# Patient Record
Sex: FEMALE | Race: WHITE | NOT HISPANIC OR LATINO | Employment: OTHER | URBAN - METROPOLITAN AREA
[De-identification: names, ages, dates, MRNs, and addresses within clinical notes are randomized per-mention and may not be internally consistent; named-entity substitution may affect disease eponyms.]

---

## 2018-09-05 ENCOUNTER — TRANSFERRED RECORDS (OUTPATIENT)
Dept: HEALTH INFORMATION MANAGEMENT | Facility: CLINIC | Age: 73
End: 2018-09-05

## 2018-09-25 ENCOUNTER — TRANSFERRED RECORDS (OUTPATIENT)
Dept: HEALTH INFORMATION MANAGEMENT | Facility: CLINIC | Age: 73
End: 2018-09-25

## 2018-11-16 ENCOUNTER — TRANSFERRED RECORDS (OUTPATIENT)
Dept: HEALTH INFORMATION MANAGEMENT | Facility: CLINIC | Age: 73
End: 2018-11-16

## 2019-09-10 ENCOUNTER — MEDICAL CORRESPONDENCE (OUTPATIENT)
Dept: HEALTH INFORMATION MANAGEMENT | Facility: CLINIC | Age: 74
End: 2019-09-10

## 2019-09-10 ENCOUNTER — TRANSFERRED RECORDS (OUTPATIENT)
Dept: HEALTH INFORMATION MANAGEMENT | Facility: CLINIC | Age: 74
End: 2019-09-10

## 2019-09-16 ENCOUNTER — TELEPHONE (OUTPATIENT)
Dept: AUDIOLOGY | Facility: CLINIC | Age: 74
End: 2019-09-16

## 2024-05-24 ENCOUNTER — APPOINTMENT (OUTPATIENT)
Dept: GENERAL RADIOLOGY | Facility: CLINIC | Age: 79
DRG: 871 | End: 2024-05-24
Attending: HOSPITALIST
Payer: COMMERCIAL

## 2024-05-24 ENCOUNTER — HOSPITAL ENCOUNTER (INPATIENT)
Facility: CLINIC | Age: 79
LOS: 7 days | Discharge: NURSING, REHAB OR LONG-TERM CARE | DRG: 871 | End: 2024-05-31
Attending: EMERGENCY MEDICINE | Admitting: HOSPITALIST
Payer: COMMERCIAL

## 2024-05-24 ENCOUNTER — APPOINTMENT (OUTPATIENT)
Dept: ULTRASOUND IMAGING | Facility: CLINIC | Age: 79
DRG: 871 | End: 2024-05-24
Attending: EMERGENCY MEDICINE
Payer: COMMERCIAL

## 2024-05-24 ENCOUNTER — APPOINTMENT (OUTPATIENT)
Dept: CT IMAGING | Facility: CLINIC | Age: 79
DRG: 871 | End: 2024-05-24
Attending: EMERGENCY MEDICINE
Payer: COMMERCIAL

## 2024-05-24 ENCOUNTER — APPOINTMENT (OUTPATIENT)
Dept: GENERAL RADIOLOGY | Facility: CLINIC | Age: 79
DRG: 871 | End: 2024-05-24
Attending: EMERGENCY MEDICINE
Payer: COMMERCIAL

## 2024-05-24 DIAGNOSIS — F90.1 ATTENTION DEFICIT HYPERACTIVITY DISORDER (ADHD), PREDOMINANTLY HYPERACTIVE TYPE: ICD-10-CM

## 2024-05-24 DIAGNOSIS — J90 PLEURAL EFFUSION ON LEFT: ICD-10-CM

## 2024-05-24 DIAGNOSIS — J18.9 PNEUMONIA OF LEFT LUNG DUE TO INFECTIOUS ORGANISM, UNSPECIFIED PART OF LUNG: ICD-10-CM

## 2024-05-24 DIAGNOSIS — A41.9 SEPTIC SHOCK (H): ICD-10-CM

## 2024-05-24 DIAGNOSIS — A41.9 SEVERE SEPSIS (H): Primary | ICD-10-CM

## 2024-05-24 DIAGNOSIS — R65.21 SEPTIC SHOCK (H): ICD-10-CM

## 2024-05-24 DIAGNOSIS — R65.20 SEVERE SEPSIS (H): Primary | ICD-10-CM

## 2024-05-24 DIAGNOSIS — F41.9 ANXIETY: ICD-10-CM

## 2024-05-24 LAB
ABO/RH(D): NORMAL
ALBUMIN SERPL BCG-MCNC: 2.7 G/DL (ref 3.5–5.2)
ALP SERPL-CCNC: 182 U/L (ref 40–150)
ALT SERPL W P-5'-P-CCNC: 51 U/L (ref 0–50)
ANION GAP SERPL CALCULATED.3IONS-SCNC: 19 MMOL/L (ref 7–15)
ANTIBODY SCREEN: NEGATIVE
APPEARANCE FLD: ABNORMAL
AST SERPL W P-5'-P-CCNC: 63 U/L (ref 0–45)
ATRIAL RATE - MUSE: 109 BPM
BASE EXCESS BLDV CALC-SCNC: 6 MMOL/L (ref -3–3)
BASOPHILS # BLD AUTO: 0.1 10E3/UL (ref 0–0.2)
BASOPHILS NFR BLD AUTO: 0 %
BILIRUB SERPL-MCNC: 0.7 MG/DL
BUN SERPL-MCNC: 32.2 MG/DL (ref 8–23)
CALCIUM SERPL-MCNC: 8.7 MG/DL (ref 8.8–10.2)
CELL COUNT BODY FLUID SOURCE: ABNORMAL
CHLORIDE SERPL-SCNC: 97 MMOL/L (ref 98–107)
COLOR FLD: YELLOW
CREAT SERPL-MCNC: 0.63 MG/DL (ref 0.51–0.95)
D DIMER PPP FEU-MCNC: 1.17 UG/ML FEU (ref 0–0.5)
DEPRECATED HCO3 PLAS-SCNC: 27 MMOL/L (ref 22–29)
DIASTOLIC BLOOD PRESSURE - MUSE: NORMAL MMHG
EGFRCR SERPLBLD CKD-EPI 2021: 90 ML/MIN/1.73M2
EOSINOPHIL # BLD AUTO: 0 10E3/UL (ref 0–0.7)
EOSINOPHIL NFR BLD AUTO: 0 %
ERYTHROCYTE [DISTWIDTH] IN BLOOD BY AUTOMATED COUNT: 15.1 % (ref 10–15)
FLUAV RNA SPEC QL NAA+PROBE: NEGATIVE
FLUBV RNA RESP QL NAA+PROBE: NEGATIVE
GLUCOSE BLDC GLUCOMTR-MCNC: 124 MG/DL (ref 70–99)
GLUCOSE SERPL-MCNC: 181 MG/DL (ref 70–99)
HCO3 BLDV-SCNC: 32 MMOL/L (ref 21–28)
HCT VFR BLD AUTO: 41.1 % (ref 35–47)
HGB BLD-MCNC: 12.8 G/DL (ref 11.7–15.7)
HOLD SPECIMEN: NORMAL
HOLD SPECIMEN: NORMAL
IMM GRANULOCYTES # BLD: 0.3 10E3/UL
IMM GRANULOCYTES NFR BLD: 1 %
INTERPRETATION ECG - MUSE: NORMAL
LACTATE BLD-SCNC: 4.8 MMOL/L
LACTATE SERPL-SCNC: 3.2 MMOL/L (ref 0.7–2)
LACTATE SERPL-SCNC: 3.2 MMOL/L (ref 0.7–2)
LACTATE SERPL-SCNC: 3.5 MMOL/L (ref 0.7–2)
LYMPHOCYTES # BLD AUTO: 2.2 10E3/UL (ref 0.8–5.3)
LYMPHOCYTES NFR BLD AUTO: 12 %
MAGNESIUM SERPL-MCNC: 2.2 MG/DL (ref 1.7–2.3)
MCH RBC QN AUTO: 27.8 PG (ref 26.5–33)
MCHC RBC AUTO-ENTMCNC: 31.1 G/DL (ref 31.5–36.5)
MCV RBC AUTO: 89 FL (ref 78–100)
MONOCYTES # BLD AUTO: 2.2 10E3/UL (ref 0–1.3)
MONOCYTES NFR BLD AUTO: 12 %
NEUTROPHILS # BLD AUTO: 13.2 10E3/UL (ref 1.6–8.3)
NEUTROPHILS NFR BLD AUTO: 75 %
NRBC # BLD AUTO: 0.1 10E3/UL
NRBC BLD AUTO-RTO: 0 /100
P AXIS - MUSE: 49 DEGREES
PCO2 BLDV: 52 MM HG (ref 40–50)
PH BLDV: 7.41 [PH] (ref 7.32–7.43)
PLATELET # BLD AUTO: 801 10E3/UL (ref 150–450)
PO2 BLDV: 25 MM HG (ref 25–47)
POTASSIUM SERPL-SCNC: 3.7 MMOL/L (ref 3.4–5.3)
PR INTERVAL - MUSE: 138 MS
PROT SERPL-MCNC: 6.6 G/DL (ref 6.4–8.3)
QRS DURATION - MUSE: 68 MS
QT - MUSE: 366 MS
QTC - MUSE: 492 MS
R AXIS - MUSE: 48 DEGREES
RBC # BLD AUTO: 4.6 10E6/UL (ref 3.8–5.2)
RSV RNA SPEC NAA+PROBE: NEGATIVE
SAO2 % BLDV: 45 % (ref 70–75)
SARS-COV-2 RNA RESP QL NAA+PROBE: NEGATIVE
SODIUM SERPL-SCNC: 143 MMOL/L (ref 135–145)
SPECIMEN EXPIRATION DATE: NORMAL
SYSTOLIC BLOOD PRESSURE - MUSE: NORMAL MMHG
T AXIS - MUSE: 39 DEGREES
TSH SERPL DL<=0.005 MIU/L-ACNC: 5.15 UIU/ML (ref 0.3–4.2)
VENTRICULAR RATE- MUSE: 109 BPM
WBC # BLD AUTO: 17.9 10E3/UL (ref 4–11)
WBC # FLD AUTO: ABNORMAL /UL

## 2024-05-24 PROCEDURE — 88305 TISSUE EXAM BY PATHOLOGIST: CPT | Mod: TC | Performed by: EMERGENCY MEDICINE

## 2024-05-24 PROCEDURE — 258N000003 HC RX IP 258 OP 636: Performed by: EMERGENCY MEDICINE

## 2024-05-24 PROCEDURE — 250N000011 HC RX IP 250 OP 636: Performed by: EMERGENCY MEDICINE

## 2024-05-24 PROCEDURE — 83605 ASSAY OF LACTIC ACID: CPT

## 2024-05-24 PROCEDURE — 258N000003 HC RX IP 258 OP 636: Performed by: HOSPITALIST

## 2024-05-24 PROCEDURE — 99285 EMERGENCY DEPT VISIT HI MDM: CPT | Mod: 25

## 2024-05-24 PROCEDURE — 87205 SMEAR GRAM STAIN: CPT | Performed by: EMERGENCY MEDICINE

## 2024-05-24 PROCEDURE — 86900 BLOOD TYPING SEROLOGIC ABO: CPT | Performed by: EMERGENCY MEDICINE

## 2024-05-24 PROCEDURE — C1729 CATH, DRAINAGE: HCPCS

## 2024-05-24 PROCEDURE — 99223 1ST HOSP IP/OBS HIGH 75: CPT | Mod: AI | Performed by: HOSPITALIST

## 2024-05-24 PROCEDURE — 120N000013 HC R&B IMCU

## 2024-05-24 PROCEDURE — 87637 SARSCOV2&INF A&B&RSV AMP PRB: CPT | Performed by: EMERGENCY MEDICINE

## 2024-05-24 PROCEDURE — 87070 CULTURE OTHR SPECIMN AEROBIC: CPT | Performed by: EMERGENCY MEDICINE

## 2024-05-24 PROCEDURE — 83735 ASSAY OF MAGNESIUM: CPT | Performed by: EMERGENCY MEDICINE

## 2024-05-24 PROCEDURE — 36415 COLL VENOUS BLD VENIPUNCTURE: CPT | Performed by: EMERGENCY MEDICINE

## 2024-05-24 PROCEDURE — 93005 ELECTROCARDIOGRAM TRACING: CPT

## 2024-05-24 PROCEDURE — 82040 ASSAY OF SERUM ALBUMIN: CPT | Performed by: EMERGENCY MEDICINE

## 2024-05-24 PROCEDURE — 250N000009 HC RX 250: Performed by: EMERGENCY MEDICINE

## 2024-05-24 PROCEDURE — 0W9B30Z DRAINAGE OF LEFT PLEURAL CAVITY WITH DRAINAGE DEVICE, PERCUTANEOUS APPROACH: ICD-10-PCS | Performed by: RADIOLOGY

## 2024-05-24 PROCEDURE — 999N000065 XR CHEST PORT 1 VIEW

## 2024-05-24 PROCEDURE — C1769 GUIDE WIRE: HCPCS

## 2024-05-24 PROCEDURE — 87075 CULTR BACTERIA EXCEPT BLOOD: CPT | Performed by: EMERGENCY MEDICINE

## 2024-05-24 PROCEDURE — 89050 BODY FLUID CELL COUNT: CPT | Performed by: EMERGENCY MEDICINE

## 2024-05-24 PROCEDURE — 96365 THER/PROPH/DIAG IV INF INIT: CPT | Mod: 59

## 2024-05-24 PROCEDURE — 87186 SC STD MICRODIL/AGAR DIL: CPT | Performed by: EMERGENCY MEDICINE

## 2024-05-24 PROCEDURE — 71275 CT ANGIOGRAPHY CHEST: CPT

## 2024-05-24 PROCEDURE — 96366 THER/PROPH/DIAG IV INF ADDON: CPT

## 2024-05-24 PROCEDURE — 96367 TX/PROPH/DG ADDL SEQ IV INF: CPT

## 2024-05-24 PROCEDURE — 82803 BLOOD GASES ANY COMBINATION: CPT

## 2024-05-24 PROCEDURE — 87149 DNA/RNA DIRECT PROBE: CPT | Performed by: EMERGENCY MEDICINE

## 2024-05-24 PROCEDURE — 36415 COLL VENOUS BLD VENIPUNCTURE: CPT

## 2024-05-24 PROCEDURE — 71045 X-RAY EXAM CHEST 1 VIEW: CPT

## 2024-05-24 PROCEDURE — 84439 ASSAY OF FREE THYROXINE: CPT | Performed by: HOSPITALIST

## 2024-05-24 PROCEDURE — 250N000011 HC RX IP 250 OP 636: Performed by: HOSPITALIST

## 2024-05-24 PROCEDURE — 85379 FIBRIN DEGRADATION QUANT: CPT | Performed by: EMERGENCY MEDICINE

## 2024-05-24 PROCEDURE — 85025 COMPLETE CBC W/AUTO DIFF WBC: CPT | Performed by: EMERGENCY MEDICINE

## 2024-05-24 PROCEDURE — 83605 ASSAY OF LACTIC ACID: CPT | Performed by: EMERGENCY MEDICINE

## 2024-05-24 PROCEDURE — 84443 ASSAY THYROID STIM HORMONE: CPT | Performed by: HOSPITALIST

## 2024-05-24 RX ORDER — SODIUM CHLORIDE 9 MG/ML
INJECTION, SOLUTION INTRAVENOUS CONTINUOUS
Status: DISCONTINUED | OUTPATIENT
Start: 2024-05-24 | End: 2024-05-25

## 2024-05-24 RX ORDER — PIPERACILLIN SODIUM, TAZOBACTAM SODIUM 4; .5 G/20ML; G/20ML
4.5 INJECTION, POWDER, LYOPHILIZED, FOR SOLUTION INTRAVENOUS EVERY 6 HOURS
Status: DISCONTINUED | OUTPATIENT
Start: 2024-05-24 | End: 2024-05-27

## 2024-05-24 RX ORDER — PIPERACILLIN SODIUM, TAZOBACTAM SODIUM 4; .5 G/20ML; G/20ML
4.5 INJECTION, POWDER, LYOPHILIZED, FOR SOLUTION INTRAVENOUS ONCE
Status: COMPLETED | OUTPATIENT
Start: 2024-05-24 | End: 2024-05-24

## 2024-05-24 RX ORDER — CITALOPRAM HYDROBROMIDE 20 MG/1
40 TABLET ORAL DAILY
Status: DISCONTINUED | OUTPATIENT
Start: 2024-05-25 | End: 2024-05-24

## 2024-05-24 RX ORDER — BUPROPION HYDROCHLORIDE 150 MG/1
300 TABLET ORAL EVERY MORNING
Status: DISCONTINUED | OUTPATIENT
Start: 2024-05-25 | End: 2024-05-24

## 2024-05-24 RX ORDER — NALOXONE HYDROCHLORIDE 0.4 MG/ML
0.2 INJECTION, SOLUTION INTRAMUSCULAR; INTRAVENOUS; SUBCUTANEOUS
Status: DISCONTINUED | OUTPATIENT
Start: 2024-05-24 | End: 2024-05-31 | Stop reason: HOSPADM

## 2024-05-24 RX ORDER — HYDROMORPHONE HCL IN WATER/PF 6 MG/30 ML
PATIENT CONTROLLED ANALGESIA SYRINGE INTRAVENOUS
Status: DISCONTINUED
Start: 2024-05-24 | End: 2024-05-24 | Stop reason: HOSPADM

## 2024-05-24 RX ORDER — LIDOCAINE HYDROCHLORIDE 10 MG/ML
10 INJECTION, SOLUTION EPIDURAL; INFILTRATION; INTRACAUDAL; PERINEURAL ONCE
Status: DISCONTINUED | OUTPATIENT
Start: 2024-05-24 | End: 2024-05-24

## 2024-05-24 RX ORDER — CITALOPRAM HYDROBROMIDE 40 MG/1
40 TABLET ORAL DAILY
Status: ON HOLD | COMMUNITY
End: 2024-05-30

## 2024-05-24 RX ORDER — AMOXICILLIN 250 MG
1 CAPSULE ORAL 2 TIMES DAILY PRN
Status: DISCONTINUED | OUTPATIENT
Start: 2024-05-24 | End: 2024-05-31 | Stop reason: HOSPADM

## 2024-05-24 RX ORDER — IOPAMIDOL 755 MG/ML
61 INJECTION, SOLUTION INTRAVASCULAR ONCE
Status: COMPLETED | OUTPATIENT
Start: 2024-05-24 | End: 2024-05-24

## 2024-05-24 RX ORDER — NALOXONE HYDROCHLORIDE 0.4 MG/ML
0.4 INJECTION, SOLUTION INTRAMUSCULAR; INTRAVENOUS; SUBCUTANEOUS
Status: DISCONTINUED | OUTPATIENT
Start: 2024-05-24 | End: 2024-05-31 | Stop reason: HOSPADM

## 2024-05-24 RX ORDER — ACETAMINOPHEN 325 MG/1
650 TABLET ORAL EVERY 4 HOURS PRN
Status: DISCONTINUED | OUTPATIENT
Start: 2024-05-24 | End: 2024-05-31 | Stop reason: HOSPADM

## 2024-05-24 RX ORDER — CALCIUM CARBONATE 500 MG/1
1000 TABLET, CHEWABLE ORAL 4 TIMES DAILY PRN
Status: DISCONTINUED | OUTPATIENT
Start: 2024-05-24 | End: 2024-05-31 | Stop reason: HOSPADM

## 2024-05-24 RX ORDER — ONDANSETRON 2 MG/ML
4 INJECTION INTRAMUSCULAR; INTRAVENOUS EVERY 6 HOURS PRN
Status: DISCONTINUED | OUTPATIENT
Start: 2024-05-24 | End: 2024-05-24

## 2024-05-24 RX ORDER — LEVOTHYROXINE SODIUM 100 UG/1
100 TABLET ORAL DAILY
COMMUNITY

## 2024-05-24 RX ORDER — BUPROPION HYDROCHLORIDE 300 MG/1
300 TABLET ORAL EVERY MORNING
Status: ON HOLD | COMMUNITY
End: 2024-05-30

## 2024-05-24 RX ORDER — TERIPARATIDE 250 UG/ML
20 INJECTION, SOLUTION SUBCUTANEOUS DAILY
Status: ON HOLD | COMMUNITY
End: 2024-05-31

## 2024-05-24 RX ORDER — PANTOPRAZOLE SODIUM 40 MG/1
40 TABLET, DELAYED RELEASE ORAL
Status: DISCONTINUED | OUTPATIENT
Start: 2024-05-25 | End: 2024-05-24

## 2024-05-24 RX ORDER — AMLODIPINE BESYLATE 10 MG/1
10 TABLET ORAL DAILY
Status: DISCONTINUED | OUTPATIENT
Start: 2024-05-25 | End: 2024-05-24

## 2024-05-24 RX ORDER — ESOMEPRAZOLE MAGNESIUM 40 MG/1
40 CAPSULE, DELAYED RELEASE ORAL 2 TIMES DAILY
COMMUNITY

## 2024-05-24 RX ORDER — LIDOCAINE 40 MG/G
CREAM TOPICAL
Status: DISCONTINUED | OUTPATIENT
Start: 2024-05-24 | End: 2024-05-30

## 2024-05-24 RX ORDER — AMOXICILLIN 250 MG
2 CAPSULE ORAL 2 TIMES DAILY PRN
Status: DISCONTINUED | OUTPATIENT
Start: 2024-05-24 | End: 2024-05-31 | Stop reason: HOSPADM

## 2024-05-24 RX ORDER — ONDANSETRON 4 MG/1
4 TABLET, ORALLY DISINTEGRATING ORAL EVERY 6 HOURS PRN
Status: DISCONTINUED | OUTPATIENT
Start: 2024-05-24 | End: 2024-05-24

## 2024-05-24 RX ORDER — LIDOCAINE 40 MG/G
CREAM TOPICAL
Status: DISCONTINUED | OUTPATIENT
Start: 2024-05-24 | End: 2024-05-31 | Stop reason: HOSPADM

## 2024-05-24 RX ORDER — HYDROMORPHONE HCL IN WATER/PF 6 MG/30 ML
0.2 PATIENT CONTROLLED ANALGESIA SYRINGE INTRAVENOUS
Status: DISCONTINUED | OUTPATIENT
Start: 2024-05-24 | End: 2024-05-31 | Stop reason: HOSPADM

## 2024-05-24 RX ORDER — HYDROMORPHONE HCL IN WATER/PF 6 MG/30 ML
0.4 PATIENT CONTROLLED ANALGESIA SYRINGE INTRAVENOUS
Status: DISCONTINUED | OUTPATIENT
Start: 2024-05-24 | End: 2024-05-31 | Stop reason: HOSPADM

## 2024-05-24 RX ORDER — METHYLPHENIDATE HYDROCHLORIDE 10 MG/1
10 TABLET ORAL 4 TIMES DAILY
Status: ON HOLD | COMMUNITY
End: 2024-05-30

## 2024-05-24 RX ORDER — DOXYCYCLINE 100 MG/10ML
100 INJECTION, POWDER, LYOPHILIZED, FOR SOLUTION INTRAVENOUS ONCE
Status: COMPLETED | OUTPATIENT
Start: 2024-05-24 | End: 2024-05-24

## 2024-05-24 RX ORDER — ACETAMINOPHEN 650 MG/1
650 SUPPOSITORY RECTAL EVERY 4 HOURS PRN
Status: DISCONTINUED | OUTPATIENT
Start: 2024-05-24 | End: 2024-05-31 | Stop reason: HOSPADM

## 2024-05-24 RX ORDER — LEVOTHYROXINE SODIUM 100 UG/1
100 TABLET ORAL DAILY
Status: DISCONTINUED | OUTPATIENT
Start: 2024-05-25 | End: 2024-05-29

## 2024-05-24 RX ORDER — CEFTRIAXONE 2 G/1
2 INJECTION, POWDER, FOR SOLUTION INTRAMUSCULAR; INTRAVENOUS ONCE
Status: DISCONTINUED | OUTPATIENT
Start: 2024-05-24 | End: 2024-05-24

## 2024-05-24 RX ORDER — AMLODIPINE BESYLATE 10 MG/1
10 TABLET ORAL DAILY
Status: ON HOLD | COMMUNITY
End: 2024-05-31

## 2024-05-24 RX ORDER — HYDROMORPHONE HCL IN WATER/PF 6 MG/30 ML
0.2 PATIENT CONTROLLED ANALGESIA SYRINGE INTRAVENOUS ONCE
Status: COMPLETED | OUTPATIENT
Start: 2024-05-24 | End: 2024-05-24

## 2024-05-24 RX ORDER — HYDROMORPHONE HYDROCHLORIDE 2 MG/1
2 TABLET ORAL EVERY 4 HOURS PRN
Status: DISCONTINUED | OUTPATIENT
Start: 2024-05-24 | End: 2024-05-31 | Stop reason: HOSPADM

## 2024-05-24 RX ORDER — PANTOPRAZOLE SODIUM 40 MG/1
40 TABLET, DELAYED RELEASE ORAL
Status: DISCONTINUED | OUTPATIENT
Start: 2024-05-25 | End: 2024-05-28

## 2024-05-24 RX ADMIN — VANCOMYCIN HYDROCHLORIDE 1750 MG: 10 INJECTION, POWDER, LYOPHILIZED, FOR SOLUTION INTRAVENOUS at 21:33

## 2024-05-24 RX ADMIN — DOXYCYCLINE 100 MG: 100 INJECTION, POWDER, LYOPHILIZED, FOR SOLUTION INTRAVENOUS at 14:49

## 2024-05-24 RX ADMIN — IOPAMIDOL 61 ML: 755 INJECTION, SOLUTION INTRAVENOUS at 15:15

## 2024-05-24 RX ADMIN — SODIUM CHLORIDE 2112 ML: 9 INJECTION, SOLUTION INTRAVENOUS at 14:05

## 2024-05-24 RX ADMIN — SODIUM CHLORIDE 1000 ML: 9 INJECTION, SOLUTION INTRAVENOUS at 19:30

## 2024-05-24 RX ADMIN — PIPERACILLIN AND TAZOBACTAM 4.5 G: 4; .5 INJECTION, POWDER, FOR SOLUTION INTRAVENOUS at 20:57

## 2024-05-24 RX ADMIN — HYDROMORPHONE HYDROCHLORIDE 0.2 MG: 0.2 INJECTION, SOLUTION INTRAMUSCULAR; INTRAVENOUS; SUBCUTANEOUS at 19:45

## 2024-05-24 RX ADMIN — PIPERACILLIN AND TAZOBACTAM 4.5 G: 4; .5 INJECTION, POWDER, FOR SOLUTION INTRAVENOUS at 14:06

## 2024-05-24 RX ADMIN — SODIUM CHLORIDE: 9 INJECTION, SOLUTION INTRAVENOUS at 20:49

## 2024-05-24 RX ADMIN — SODIUM CHLORIDE 87 ML: 9 INJECTION, SOLUTION INTRAVENOUS at 15:15

## 2024-05-24 ASSESSMENT — ACTIVITIES OF DAILY LIVING (ADL)
ADLS_ACUITY_SCORE: 35
ADLS_ACUITY_SCORE: 41
ADLS_ACUITY_SCORE: 35

## 2024-05-24 NOTE — PHARMACY-ADMISSION MEDICATION HISTORY
Pharmacist Admission Medication History    Admission medication history is complete. The information provided in this note is only as accurate as the sources available at the time of the update.    Information Source(s): Patient's pharmacy and Clinic records ([facility] [phone]; [facility] records) via in-person    Pertinent Information: Patient unable to contribute history    Changes made to PTA medication list:  Added: amlodipine, methylphenidate, levothyroxine, teriparatide, esomeprazole, evenity  Deleted: abilify (last filled 12/2023), zolpidem  Changed: wellbutrin, citalopram (added dose and directions)    Allergies reviewed with patient and updates made in EHR: no    Medication History Completed By: Ankit Neri RPH 5/24/2024 2:24 PM    PTA Med List   Medication Sig Last Dose    amLODIPine (NORVASC) 10 MG tablet Take 10 mg by mouth daily Unknown    buPROPion (WELLBUTRIN XL) 300 MG 24 hr tablet Take 300 mg by mouth every morning Unknown    citalopram (CELEXA) 40 MG tablet Take 40 mg by mouth daily Unknown    esomeprazole (NEXIUM) 40 MG DR capsule Take 40 mg by mouth 2 times daily Take 30-60 minutes before eating. Unknown    levothyroxine (SYNTHROID/LEVOTHROID) 100 MCG tablet Take 100 mcg by mouth daily Unknown    methylphenidate (RITALIN) 10 MG tablet Take 10 mg by mouth 4 times daily Unknown    romosozumab-aqqg (EVENITY) 105 MG/1.17ML SOSY injection Inject 210 mg Subcutaneous every 30 days 5/1/2024    teriparatide, recombinant, (FORTEO) 600 MCG/2.4ML SOPN injection Inject 20 mcg Subcutaneous daily Unknown

## 2024-05-24 NOTE — ED TRIAGE NOTES
Pt BIBA from home. Pt sister called EMS after pt stated she was weak and can't get up/ Pt found in bed incontinent of stool and urine. Room air sats 88%, 99% on 10 L.

## 2024-05-24 NOTE — ED PROVIDER NOTES
Emergency Department Note      History of Present Illness     Chief Complaint  Shortness of Breath and Generalized Weakness    HPI  Amita Yates is a 79 year old female who presents emergency department for shortness of breath and generalized weakness.  Patient reports that she received a injection for her bone density about a month ago.  Since then she has had a general decline.  She reports last week she has been more weak and has been texting with the sister.  Today she got so weak she asked her sister to call 911.  Medics found her in the bed with stool all over.  She had was very tachypneic and short of breath.  Hypoxic on room air.  Tolerated facemask well and did come up significantly.  They thought that she was in A-fib with RVR via her rhythm strip.    Independent Historian  EMS as detailed above.    Review of External Notes  Reviewed primary care note from 12/18/2023.  History of hypothyroidism.  Chronic fatigue on Ritalin.  Past Medical History   Medical History and Problem List  Past Medical History:   Diagnosis Date    Cataract     Depression     Fracture     Heart murmur     Hypertension        Medications  amLODIPine (NORVASC) 10 MG tablet  buPROPion (WELLBUTRIN XL) 300 MG 24 hr tablet  citalopram (CELEXA) 40 MG tablet  esomeprazole (NEXIUM) 40 MG DR capsule  levothyroxine (SYNTHROID/LEVOTHROID) 100 MCG tablet  methylphenidate (RITALIN) 10 MG tablet  romosozumab-aqqg (EVENITY) 105 MG/1.17ML SOSY injection  teriparatide, recombinant, (FORTEO) 600 MCG/2.4ML SOPN injection        Surgical History   Past Surgical History:   Procedure Laterality Date    HYSTERECTOMY RADICAL      INCISION LIMBAL RELAXING, KERATOTOMY ARCUATE  10/15/2012    Procedure: INCISION LIMBAL RELAXING, KERATOTOMY ARCUATE;;  Surgeon: Louie Moulton MD;  [facility]    PHACOEMULSIFICATION CLEAR CORNEA WITH STANDARD INTRAOCULAR LENS IMPLANT  10/1/2012    Procedure: PHACOEMULSIFICATION CLEAR CORNEA WITH STANDARD INTRAOCULAR  LENS IMPLANT;  LEFT PHACOEMULSIFICATION CLEAR CORNEA WITH STANDARD INTRAOCULAR LENS IMPLANT;  Surgeon: Louie Moulton MD;  Location: [facility]    PHACOEMULSIFICATION CLEAR CORNEA WITH STANDARD INTRAOCULAR LENS IMPLANT  10/15/2012    Procedure: PHACOEMULSIFICATION CLEAR CORNEA WITH STANDARD INTRAOCULAR LENS IMPLANT;  RIGHT PHACOEMULSIFICATION CLEAR CORNEA WITH STANDARD INTRAOCULAR LENS IMPLANT AND LIMBAL RELAXING INCISION;  Surgeon: Louie Moulton MD;  Location: [facility]     Physical Exam   Patient Vitals for the past 24 hrs:   BP Temp Temp src Pulse Resp SpO2 Weight   05/24/24 1552 -- -- -- 112 28 97 % --   05/24/24 1527 -- 97.5  F (36.4  C) Oral -- -- -- --   05/24/24 1443 133/74 -- -- 112 27 97 % --   05/24/24 1430 (!) 136/92 -- -- 111 30 98 % --   05/24/24 1413 (!) 113/95 -- -- (!) 125 28 99 % --   05/24/24 1349 -- -- -- -- -- -- 70.4 kg (155 lb 3.3 oz)   05/24/24 1348 -- -- -- (!) 121 (!) 31 99 % --   05/24/24 1338 (!) 143/107 -- -- 118 (!) 33 -- --     Physical Exam  General: Resting on the bed.  Appears ill.  Covered in stool and feces.  Head: No obvious trauma to head.  Ears, Nose, Throat:  External ears normal.  Nose normal.    Eyes:  Conjunctivae clear.  Pupils are equal, round, and reactive.   Neck: Normal range of motion.  Neck supple.   CV: Tachycardic rate and rhythm.  No murmurs.      Respiratory: Tachypneic, decreased breath sounds throughout the left lung.  No focal crackles or wheezing.  Gastrointestinal: Soft.  No distension. There is no tenderness.    Musculoskeletal: Non tender non edematous calves  Neuro: Alert. Moving all extremities appropriately.  Normal speech.    Skin: Skin is warm and dry.    Diagnostics   Lab Results   Labs Ordered and Resulted from Time of ED Arrival to Time of ED Departure   COMPREHENSIVE METABOLIC PANEL - Abnormal       Result Value    Sodium 143      Potassium 3.7      Carbon Dioxide (CO2) 27      Anion Gap 19 (*)     Urea Nitrogen 32.2 (*)     Creatinine 0.63      GFR  Estimate 90      Calcium 8.7 (*)     Chloride 97 (*)     Glucose 181 (*)     Alkaline Phosphatase 182 (*)     AST 63 (*)     ALT 51 (*)     Protein Total 6.6      Albumin 2.7 (*)     Bilirubin Total 0.7     CBC WITH PLATELETS AND DIFFERENTIAL - Abnormal    WBC Count 17.9 (*)     RBC Count 4.60      Hemoglobin 12.8      Hematocrit 41.1      MCV 89      MCH 27.8      MCHC 31.1 (*)     RDW 15.1 (*)     Platelet Count 801 (*)     % Neutrophils 75      % Lymphocytes 12      % Monocytes 12      % Eosinophils 0      % Basophils 0      % Immature Granulocytes 1      NRBCs per 100 WBC 0      Absolute Neutrophils 13.2 (*)     Absolute Lymphocytes 2.2      Absolute Monocytes 2.2 (*)     Absolute Eosinophils 0.0      Absolute Basophils 0.1      Absolute Immature Granulocytes 0.3      Absolute NRBCs 0.1     D DIMER QUANTITATIVE - Abnormal    D-Dimer Quantitative 1.17 (*)    LACTIC ACID WHOLE BLOOD WITH 1X REPEAT IN 2 HR WHEN >2 - Abnormal    Lactic Acid, Initial 3.2 (*)    ISTAT GASES LACTATE VENOUS POCT - Abnormal    Lactic Acid POCT 4.8 (*)     Bicarbonate Venous POCT 32 (*)     O2 Sat, Venous POCT 45 (*)     pCO2 Venous POCT 52 (*)     pH Venous POCT 7.41      pO2 Venous POCT 25      Base Excess/Deficit (+/-) POCT 6.0 (*)    LACTIC ACID WHOLE BLOOD - Abnormal    Lactic Acid 3.2 (*)    CELL COUNT BODY FLUID - Abnormal    Color Yellow      Clarity Turbid (*)     Cell Count Fluid Source Pleural Cavity, Left      Total Nucleated Cells 672,100     AEROBIC BACTERIAL CULTURE ROUTINE - Abnormal    Gram Stain Result 4+ Gram positive cocci in pairs and chains (*)    MAGNESIUM - Normal    Magnesium 2.2     INFLUENZA A/B, RSV, & SARS-COV2 PCR - Normal    Influenza A PCR Negative      Influenza B PCR Negative      RSV PCR Negative      SARS CoV2 PCR Negative     ROUTINE UA WITH MICROSCOPIC REFLEX TO CULTURE   DIFERENTIAL BODY FLUID   LACTIC ACID WHOLE BLOOD   TYPE AND SCREEN, ADULT    ABO/RH(D) B POS      Antibody Screen Negative       SPECIMEN EXPIRATION DATE [id number]     NON-GYNECOLOGIC CYTOLOGY   BLOOD CULTURE   BLOOD CULTURE   ANAEROBIC BACTERIAL CULTURE ROUTINE   ABO/RH TYPE AND SCREEN   CELL COUNT WITH DIFFERENTIAL FLUID       Imaging  CT Chest Pulmonary Embolism w Contrast   Final Result   IMPRESSION:   1.  Large loculated left pleural effusion with significant mass effect   and rightward mediastinal shift. Mild smooth right pleural thickening.   Findings are indeterminate, and may be infectious/inflammatory,   empyema or malignant effusion.   2.  Trace right pleural effusion and mild right lung edema/infiltrate.   3.  Moderate size hiatal hernia containing the upper half of the   stomach.      LUCAS BISHOP MD            SYSTEM ID:  [id number]      XR Chest Port 1 View   Final Result   IMPRESSION: Opacified left hemithorax, if this is an unexpected   finding consider CT scan of the chest for further evaluation. Question   some minimal atelectasis and/or infiltrate at the right base.      TEZ AYOUB MD            SYSTEM ID:  [id number]      US Chest Tube Insert    (Results Pending)       EKG     ECG results from 05/24/24   EKG 12 lead     Value    Systolic Blood Pressure     Diastolic Blood Pressure     Ventricular Rate 109    Atrial Rate 109    RI Interval 138    QRS Duration 68        QTc 492    P Axis 49    R AXIS 48    T Axis 39    Interpretation ECG      Sinus tachycardia  Low voltage QRS  T wave abnormality, consider inferior ischemia  Abnormal ECG  When compared with ECG of 24-MAY-2024 13:43, (unconfirmed)  Premature supraventricular complexes are no longer Present  Inverted T waves have replaced nonspecific T wave abnormality in Inferior leads  T wave inversion now evident in Anterior leads         Independent Interpretation  CXR shows opacification of the left hemithorax  ED Course    Medications Administered  Medications   lidocaine (PF) (XYLOCAINE) 1 % injection 10 mL (has no administration in time range)   sodium  chloride 0.9 % infusion (has no administration in time range)   sodium chloride 0.9% BOLUS 2,112 mL (0 mLs Intravenous Stopped 5/24/24 1721)   doxycycline (VIBRAMYCIN) 100 mg vial to attach to  mL bag (0 mg Intravenous Stopped 5/24/24 1721)   piperacillin-tazobactam (ZOSYN) 4.5 g vial to attach to  mL bag (0 g Intravenous Stopped 5/24/24 1528)   iopamidol (ISOVUE-370) solution 61 mL (61 mLs Intravenous $Given 5/24/24 1515)   Saline Flush (87 mLs Intravenous $Given 5/24/24 1515)       Procedures  Procedures     Discussion of Management  Admitting Hospitalist, Nadeen  Clinical Pharmacist, Devon  IR for thoracentesis vs chest tube     Social Determinants of Health adding to complexity of care  Healthcare Access/Compliance    ED Course  ED Course as of 05/24/24 1823   Fri May 24, 2024   1421 I spoke with IR physician.  They do recommend a CT scan prior to likely chest tube placement.  CT to be ordered.   1622 I spoke with hospitalist for admission      Medical Decision Making / Diagnosis   CMS Diagnoses:The patient has signs of Septic Shock  The patient has signs of septic shock as evidenced by:  1. Presence of Sepsis, AND  2. Lactic Acidosis with value greater than or equal to 4    Time septic shock diagnosis confirmed = 1340  05/24/24   as this was the time when Lactate was resulted and the level was greater than or equal to 4    3 Hour Septic Shock Bundle Completion:  1. Initial Lactic Acid Result:   Recent Labs   Lab Test 05/24/24  1723 05/24/24  1340   LACT 3.2*  3.2* 4.8*     2. Blood Cultures before Antibiotics: Yes  3. Broad Spectrum Antibiotics Administered:  yes       Anti-infectives (From admission through now)      Start     Dose/Rate Route Frequency Ordered Stop    05/24/24 1400  doxycycline (VIBRAMYCIN) 100 mg vial to attach to  mL bag         100 mg  over 1-2 Hours Intravenous ONCE 05/24/24 1347 05/24/24 1721    05/24/24 1400  piperacillin-tazobactam (ZOSYN) 4.5 g vial to attach to  " mL bag        Note to Pharmacy: For [facility], [facility] and [facility]: For Zosyn-naive patients, use the \"Zosyn initial dose + extended infusion\" order panel.    4.5 g  over 30 Minutes Intravenous ONCE 05/24/24 1355 05/24/24 1528            4. IF 30 mL/kg bolus criteria met based on:  -Lactate > 4  OR  -Initial Hypotension:  Definition:  2 low BP readings (SBP <90, MAP <65, or decrease > 40 from baseline due to infection) within 3 hrs of each other during the time period of 6 hrs before and 3 hrs  after time zero  THEN: Fluid volume administered in ED:  Full 30 mL/kg bolus given (see amount below).    BMI Readings from Last 1 Encounters:   10/15/12 26.94 kg/m      30 mL/kg fluids based on weight: 2,110 mL  30 mL/kg fluids based on IBW (must be >= 60 inches tall): Patient ideal weight not available.    Septic Shock reassessment:  1. Repeat Lactic Acid Level: 3.2  2. MAP>65 after initial IVF bolus, will continue to monitor fluid status and vital signs              and None    MIPS    CT for PE was ordered because the patient had an abnormal d-dimer.    MARTA Yates is a 79 year old female who presents to the emergency department for shortness of breath and weakness.  Vital signs notable for tachycardic and hypoxic requiring supplemental nasal cannula.  Patient is also tachypneic.  Broad differential was pursued include not limited to PE, ACS, arrhythmia, pneumonia, pneumothorax, effusion, empyema, sepsis, etc.  CBC notable for white count of 17.9, stable hemoglobin.  Lactate elevated 4.8.  Concerning for septic shock.  Blood pressures have remained stable.  No indication for pressors but fluids were indicated.  BMP shows no acute electrolyte abnormality but elevation in anion gap and slight elevation in kidney function.  D-dimer elevated prompting CT scan.  Chest x-ray showed total opacification of the left hemothorax.  Spoke with IR plan for CT scan to better differentiate.  CT shows pleural effusion.  IR plan for " chest tube as opposed to thoracentesis.  Purulent drainage.  Cultures obtained and pending.  EKG showing sinus tachycardia.  Patient will be admitted for sepsis as well as infected empyema.  Hospitalist plans to discuss with thoracic surgery.  Chest tube is already in place.  Patient looking improved.  At this point she seems reasonable for IMC.    Critical Care time was 30 minutes for this patient excluding procedures.      Disposition  The patient was admitted to the hospital.     ICD-10 Codes:    ICD-10-CM    1. Pleural effusion on left  J90       2. Pneumonia of left lung due to infectious organism, unspecified part of lung  J18.9       3. Septic shock (H)  A41.9     R65.21            Discharge Medications  New Prescriptions    No medications on file         MD Omid Reyes Jennifer L, MD  05/24/24 1927

## 2024-05-24 NOTE — H&P
[facility]    History and Physical - Hospitalist Service       Date of Admission:  5/24/2024    Assessment & Plan      Amita Yates is a 79 year old female with pmh of hypertension, depression, thyroid disease admitted on 5/24/2024 with respiratory failure from a large loculated pleural effusion/empyema      Large left loculated pleural effusion/empyema, with rightward mediastinal shift  sepsis  P/w sob, leukocytosis, tachycardia and CXR of complete opacification of the left hemithorax  CT: Large loculated left pleural effusion with significant mass effect  and rightward mediastinal shift. Mild smooth right pleural thickening.  Chest tube placed in the ED  EKG: ST with nonspecific ST changes  Plan  - admit to inpatient  - start vancomycin and zosyn  - culture and cytology  - consult thoracic surgery  - blood cultures      Chest pain after 2400 out. Around 8pm. Clamped tube and medicated patient with improvement.  discussed with thoracic (greatly appreciated). Will place on water seal for a few hours and then back to LIS. Significant improvement on the cxr.. discussed with the bedside RN      Lactic acidosis likely from sepsis  4.8 on admission  Plan  - repeat q6 hours  - gentle IVF    HTN  GERD  Thyroid  depression  Plan  - resume home meds when verified        Diet:  regular  DVT Prophylaxis: Pneumatic Compression Devices  Mehta Catheter: Not present  Lines: None     Cardiac Monitoring: None  Code Status:  full code    Clinically Significant Risk Factors Present on Admission             # Anion Gap Metabolic Acidosis: Highest Anion Gap = 19 mmol/L in last 2 days, will monitor and treat as appropriate  # Hypoalbuminemia: Lowest albumin = 2.7 g/dL at 5/24/2024  1:42 PM, will monitor as appropriate                     Disposition Plan     Medically Ready for Discharge: Anticipated in 2-4 Days           Surendra Senior DO  Hospitalist Service  [facility]  [facility]  Securely message with [employer] (more info)  Text page via [facility] Paging/Directory     ______________________________________________________________________    Chief Complaint   sob    History is obtained from the patient    History of Present Illness   Amita Yates is a 79 year old female with pmh of depression, hypertension, and hypothyroidism who presented initially with sob and generalized weakness and failure to thrive. She reports feeling poorly for at least 1 month since she had an injection for osteoporosis. With the increasing weakness her sister called 911 today and apparently she was in bed incontinent of stool and urine. She was 88% on room air which increased to 99% on 10 liters.    In the ED CXR with opacification of the left hemithorax and CT confirmed a large loculated empyema.       Past Medical History    Past Medical History:   Diagnosis Date    Cataract     Depression     Fracture     forearm    Heart murmur     Hypertension     borderline       Past Surgical History   Past Surgical History:   Procedure Laterality Date    HYSTERECTOMY RADICAL      INCISION LIMBAL RELAXING, KERATOTOMY ARCUATE  10/15/2012    Procedure: INCISION LIMBAL RELAXING, KERATOTOMY ARCUATE;;  Surgeon: Louie Moulton MD;  Location: [facility]    PHACOEMULSIFICATION CLEAR CORNEA WITH STANDARD INTRAOCULAR LENS IMPLANT  10/1/2012    Procedure: PHACOEMULSIFICATION CLEAR CORNEA WITH STANDARD INTRAOCULAR LENS IMPLANT;  LEFT PHACOEMULSIFICATION CLEAR CORNEA WITH STANDARD INTRAOCULAR LENS IMPLANT;  Surgeon: Louie Moulton MD;  Location: [facility]    PHACOEMULSIFICATION CLEAR CORNEA WITH STANDARD INTRAOCULAR LENS IMPLANT  10/15/2012    Procedure: PHACOEMULSIFICATION CLEAR CORNEA WITH STANDARD INTRAOCULAR LENS IMPLANT;  RIGHT PHACOEMULSIFICATION CLEAR CORNEA WITH STANDARD INTRAOCULAR LENS IMPLANT AND LIMBAL RELAXING INCISION;  Surgeon: Louie Moulton MD;  Location: [facility]       Prior to Admission Medications   Prior to  Admission Medications   Prescriptions Last Dose Informant Patient Reported? Taking?   amLODIPine (NORVASC) 10 MG tablet Unknown  Yes Yes   Sig: Take 10 mg by mouth daily   buPROPion (WELLBUTRIN XL) 300 MG 24 hr tablet Unknown  Yes Yes   Sig: Take 300 mg by mouth every morning   citalopram (CELEXA) 40 MG tablet Unknown  Yes Yes   Sig: Take 40 mg by mouth daily   esomeprazole (NEXIUM) 40 MG DR capsule Unknown  Yes Yes   Sig: Take 40 mg by mouth 2 times daily Take 30-60 minutes before eating.   levothyroxine (SYNTHROID/LEVOTHROID) 100 MCG tablet Unknown  Yes Yes   Sig: Take 100 mcg by mouth daily   methylphenidate (RITALIN) 10 MG tablet Unknown  Yes Yes   Sig: Take 10 mg by mouth 4 times daily   romosozumab-aqqg (EVENITY) 105 MG/1.17ML SOSY injection 5/1/2024  Yes Yes   Sig: Inject 210 mg Subcutaneous every 30 days   teriparatide, recombinant, (FORTEO) 600 MCG/2.4ML SOPN injection Unknown  Yes Yes   Sig: Inject 20 mcg Subcutaneous daily      Facility-Administered Medications: None        Review of Systems    The 10 point Review of Systems is negative other than noted in the HPI or here.     Social History   I have reviewed this patient's social history and updated it with pertinent information if needed.  Social History     Tobacco Use    Smoking status: Never   Substance Use Topics    Alcohol use: Yes    Drug use: No             Allergies   No Known Allergies     Physical Exam   Vital Signs: Temp: 97.5  F (36.4  C) Temp src: Oral BP: 133/74 Pulse: 112   Resp: 28 SpO2: 97 %      Weight: 155 lbs 3.26 oz    General Appearance: NAD, very [place], uncharged cochlear implant  Respiratory: left CT in place draining white colored discharge  Cardiovascular: regular rate and rhythm  GI: soft, nontender, and nondistender  Other: none     Medical Decision Making       75 MINUTES SPENT BY ME on the date of service doing chart review, history, exam, documentation & further activities per the note.      Data     I have personally  reviewed the following data over the past 24 hrs:    17.9 (H)  \   12.8   / 801 (H)     143 97 (L) 32.2 (H) /  181 (H)   3.7 27 0.63 \     ALT: 51 (H) AST: 63 (H) AP: 182 (H) TBILI: 0.7   ALB: 2.7 (L) TOT PROTEIN: 6.6 LIPASE: N/A     Procal: N/A CRP: N/A Lactic Acid: 4.8 (HH)       INR:  N/A PTT:  N/A   D-dimer:  1.17 (H) Fibrinogen:  N/A       Imaging results reviewed over the past 24 hrs:   Recent Results (from the past 24 hour(s))   XR Chest Port 1 View    Narrative    CHEST ONE VIEW  5/24/2024 1:50 PM     HISTORY: Hypoxic.    COMPARISON: None.      Impression    IMPRESSION: Opacified left hemithorax, if this is an unexpected  finding consider CT scan of the chest for further evaluation. Question  some minimal atelectasis and/or infiltrate at the right base.    TEZ AYOUB MD         SYSTEM ID:  [id number]   CT Chest Pulmonary Embolism w Contrast    Narrative    CT CHEST PULMONARY EMBOLISM W CONTRAST 5/24/2024 3:26 PM    CLINICAL HISTORY: chest pain  TECHNIQUE: CT angiogram chest during arterial phase injection IV  contrast. 2D and 3D MIP reconstructions were performed by the CT  technologist. Dose reduction techniques were used.     CONTRAST: 61mL Isovue-370    COMPARISON: Chest x-ray earlier today    FINDINGS:  ANGIOGRAM CHEST: Pulmonary arteries are normal caliber and negative  for pulmonary emboli. Thoracic aorta is negative for dissection. No CT  evidence of right heart strain.    LUNGS AND PLEURA: Large loculated left pleural effusion with near  complete atelectasis of the left lung. There is significant mass  effect with resultant rightward mediastinal shift. The effusion has  some mild smooth wall thickening without obvious nodular pleural based  masses.. Trace right pleural effusion. Mild linear interlobular septal  thickening and linear and patchy opacities scattered in the right  lung, likely due to edema or infection.    MEDIASTINUM/AXILLAE: No lymphadenopathy. No thoracic aortic aneurysm  moderate  coronary artery calcifications. No pericardial effusion.  Moderate-sized hiatal hernia.    UPPER ABDOMEN: Small calcifications in the left adrenal gland, likely  the sequela of chronic infection. Pancreatic parenchymal atrophy.  Cholecystectomy.    MUSCULOSKELETAL: No suspicious lesions in the bones. Degenerative  changes in the spine.      Impression    IMPRESSION:  1.  Large loculated left pleural effusion with significant mass effect  and rightward mediastinal shift. Mild smooth right pleural thickening.  Findings are indeterminate, and may be infectious/inflammatory,  empyema or malignant effusion.  2.  Trace right pleural effusion and mild right lung edema/infiltrate.  3.  Moderate size hiatal hernia containing the upper half of the  stomach.    LUCAS BISHOP MD         SYSTEM ID:  [id number]

## 2024-05-24 NOTE — PROGRESS NOTES
RECEIVING UNIT ED HANDOFF REVIEW    ED Nurse Handoff Report was received by: Lou Chamberlain RN on May 24, 2024 at 6:50 PM

## 2024-05-24 NOTE — CONSULTS
Patient is on US schedule today Friday 5/24/24 for a Left chest tube placement with local.     The chest tube will be placed in ED toay    -Labs WNL for procedure.    -No NPO required.   -Consent will be done prior to procedure.      Please contact the US department at [phone] for procedural related questions.     Discussed with Dr Bryan and Dr Howard today.    Total time: 20 minutes     Thanks, [facility] Inter[facility] CNP ([phone]) (phone [phone])

## 2024-05-24 NOTE — ED NOTES
Pt given fill bed bath and linen change. Pt covered in stool and urine from mid back down to feet. Saccral wound noted (See LDA)

## 2024-05-24 NOTE — IR NOTE
10 Greek chest tube placed into left pleural space. Purulent drainage. Hooked to pleurevac suction.

## 2024-05-25 ENCOUNTER — APPOINTMENT (OUTPATIENT)
Dept: GENERAL RADIOLOGY | Facility: CLINIC | Age: 79
DRG: 871 | End: 2024-05-25
Attending: HOSPITALIST
Payer: COMMERCIAL

## 2024-05-25 LAB
ALBUMIN SERPL BCG-MCNC: 2.4 G/DL (ref 3.5–5.2)
ALBUMIN UR-MCNC: 10 MG/DL
ALP SERPL-CCNC: 170 U/L (ref 40–150)
ALT SERPL W P-5'-P-CCNC: 50 U/L (ref 0–50)
AMORPH CRY #/AREA URNS HPF: ABNORMAL /HPF
ANION GAP SERPL CALCULATED.3IONS-SCNC: 12 MMOL/L (ref 7–15)
APPEARANCE UR: CLEAR
AST SERPL W P-5'-P-CCNC: 56 U/L (ref 0–45)
BACTERIA #/AREA URNS HPF: ABNORMAL /HPF
BASOPHILS # BLD AUTO: 0 10E3/UL (ref 0–0.2)
BASOPHILS NFR BLD AUTO: 0 %
BILIRUB SERPL-MCNC: 0.5 MG/DL
BILIRUB UR QL STRIP: NEGATIVE
BUN SERPL-MCNC: 24 MG/DL (ref 8–23)
CALCIUM SERPL-MCNC: 7.9 MG/DL (ref 8.8–10.2)
CHLORIDE SERPL-SCNC: 109 MMOL/L (ref 98–107)
COLOR UR AUTO: YELLOW
CREAT SERPL-MCNC: 0.68 MG/DL (ref 0.51–0.95)
CRP SERPL-MCNC: 172.15 MG/L
DEPRECATED HCO3 PLAS-SCNC: 26 MMOL/L (ref 22–29)
EGFRCR SERPLBLD CKD-EPI 2021: 89 ML/MIN/1.73M2
ENTEROCOCCUS FAECALIS: NOT DETECTED
ENTEROCOCCUS FAECIUM: NOT DETECTED
EOSINOPHIL # BLD AUTO: 0 10E3/UL (ref 0–0.7)
EOSINOPHIL NFR BLD AUTO: 0 %
ERYTHROCYTE [DISTWIDTH] IN BLOOD BY AUTOMATED COUNT: 15.3 % (ref 10–15)
GLUCOSE SERPL-MCNC: 105 MG/DL (ref 70–99)
GLUCOSE UR STRIP-MCNC: NEGATIVE MG/DL
HCT VFR BLD AUTO: 39.4 % (ref 35–47)
HGB BLD-MCNC: 12.4 G/DL (ref 11.7–15.7)
HGB UR QL STRIP: NEGATIVE
IMM GRANULOCYTES # BLD: 0.3 10E3/UL
IMM GRANULOCYTES NFR BLD: 2 %
KETONES UR STRIP-MCNC: ABNORMAL MG/DL
LACTATE SERPL-SCNC: 1.4 MMOL/L (ref 0.7–2)
LACTATE SERPL-SCNC: 1.5 MMOL/L (ref 0.7–2)
LACTATE SERPL-SCNC: 1.6 MMOL/L (ref 0.7–2)
LACTATE SERPL-SCNC: 1.8 MMOL/L (ref 0.7–2)
LEUKOCYTE ESTERASE UR QL STRIP: NEGATIVE
LISTERIA SPECIES (DETECTED/NOT DETECTED): NOT DETECTED
LYMPHOCYTES # BLD AUTO: 2.1 10E3/UL (ref 0.8–5.3)
LYMPHOCYTES NFR BLD AUTO: 17 %
MCH RBC QN AUTO: 28.3 PG (ref 26.5–33)
MCHC RBC AUTO-ENTMCNC: 31.5 G/DL (ref 31.5–36.5)
MCV RBC AUTO: 90 FL (ref 78–100)
MONOCYTES # BLD AUTO: 1.7 10E3/UL (ref 0–1.3)
MONOCYTES NFR BLD AUTO: 13 %
MUCOUS THREADS #/AREA URNS LPF: PRESENT /LPF
NEUTROPHILS # BLD AUTO: 8.4 10E3/UL (ref 1.6–8.3)
NEUTROPHILS NFR BLD AUTO: 68 %
NITRATE UR QL: NEGATIVE
NRBC # BLD AUTO: 0.1 10E3/UL
NRBC BLD AUTO-RTO: 1 /100
PH UR STRIP: 5.5 [PH] (ref 5–7)
PLATELET # BLD AUTO: 576 10E3/UL (ref 150–450)
POTASSIUM SERPL-SCNC: 3.3 MMOL/L (ref 3.4–5.3)
POTASSIUM SERPL-SCNC: 3.7 MMOL/L (ref 3.4–5.3)
PROCALCITONIN SERPL IA-MCNC: 0.24 NG/ML
PROT SERPL-MCNC: 5.7 G/DL (ref 6.4–8.3)
RBC # BLD AUTO: 4.38 10E6/UL (ref 3.8–5.2)
RBC URINE: 4 /HPF
SODIUM SERPL-SCNC: 147 MMOL/L (ref 135–145)
SP GR UR STRIP: 1.02 (ref 1–1.03)
SQUAMOUS EPITHELIAL: 6 /HPF
STAPHYLOCOCCUS AUREUS: NOT DETECTED
STAPHYLOCOCCUS EPIDERMIDIS: NOT DETECTED
STAPHYLOCOCCUS LUGDUNENSIS: NOT DETECTED
STAPHYLOCOCCUS SPECIES: NOT DETECTED
STREPTOCOCCUS AGALACTIAE: NOT DETECTED
STREPTOCOCCUS ANGINOSUS GROUP: NOT DETECTED
STREPTOCOCCUS PNEUMONIAE: NOT DETECTED
STREPTOCOCCUS PYOGENES: NOT DETECTED
STREPTOCOCCUS SPECIES: NOT DETECTED
T4 FREE SERPL-MCNC: 0.87 NG/DL (ref 0.9–1.7)
UROBILINOGEN UR STRIP-MCNC: NORMAL MG/DL
WBC # BLD AUTO: 12.5 10E3/UL (ref 4–11)
WBC URINE: 3 /HPF

## 2024-05-25 PROCEDURE — 258N000003 HC RX IP 258 OP 636: Performed by: HOSPITALIST

## 2024-05-25 PROCEDURE — 84132 ASSAY OF SERUM POTASSIUM: CPT | Performed by: HOSPITALIST

## 2024-05-25 PROCEDURE — 71045 X-RAY EXAM CHEST 1 VIEW: CPT

## 2024-05-25 PROCEDURE — 85025 COMPLETE CBC W/AUTO DIFF WBC: CPT | Performed by: HOSPITALIST

## 2024-05-25 PROCEDURE — 83605 ASSAY OF LACTIC ACID: CPT | Performed by: HOSPITALIST

## 2024-05-25 PROCEDURE — 36415 COLL VENOUS BLD VENIPUNCTURE: CPT | Performed by: HOSPITALIST

## 2024-05-25 PROCEDURE — 99232 SBSQ HOSP IP/OBS MODERATE 35: CPT | Performed by: HOSPITALIST

## 2024-05-25 PROCEDURE — 250N000009 HC RX 250: Performed by: THORACIC SURGERY (CARDIOTHORACIC VASCULAR SURGERY)

## 2024-05-25 PROCEDURE — 250N000011 HC RX IP 250 OP 636: Mod: JZ | Performed by: THORACIC SURGERY (CARDIOTHORACIC VASCULAR SURGERY)

## 2024-05-25 PROCEDURE — 250N000013 HC RX MED GY IP 250 OP 250 PS 637: Performed by: HOSPITALIST

## 2024-05-25 PROCEDURE — 81001 URINALYSIS AUTO W/SCOPE: CPT | Performed by: HOSPITALIST

## 2024-05-25 PROCEDURE — 258N000003 HC RX IP 258 OP 636: Performed by: THORACIC SURGERY (CARDIOTHORACIC VASCULAR SURGERY)

## 2024-05-25 PROCEDURE — 250N000011 HC RX IP 250 OP 636: Performed by: HOSPITALIST

## 2024-05-25 PROCEDURE — 86140 C-REACTIVE PROTEIN: CPT | Performed by: HOSPITALIST

## 2024-05-25 PROCEDURE — 120N000013 HC R&B IMCU

## 2024-05-25 PROCEDURE — 80053 COMPREHEN METABOLIC PANEL: CPT | Performed by: HOSPITALIST

## 2024-05-25 PROCEDURE — 3E0L317 INTRODUCTION OF OTHER THROMBOLYTIC INTO PLEURAL CAVITY, PERCUTANEOUS APPROACH: ICD-10-PCS | Performed by: THORACIC SURGERY (CARDIOTHORACIC VASCULAR SURGERY)

## 2024-05-25 PROCEDURE — 84145 PROCALCITONIN (PCT): CPT | Performed by: HOSPITALIST

## 2024-05-25 RX ORDER — DEXTROSE MONOHYDRATE 50 MG/ML
INJECTION, SOLUTION INTRAVENOUS CONTINUOUS
Status: DISCONTINUED | OUTPATIENT
Start: 2024-05-25 | End: 2024-05-26

## 2024-05-25 RX ORDER — POTASSIUM CHLORIDE 1500 MG/1
40 TABLET, EXTENDED RELEASE ORAL ONCE
Status: COMPLETED | OUTPATIENT
Start: 2024-05-25 | End: 2024-05-25

## 2024-05-25 RX ORDER — BUPROPION HYDROCHLORIDE 150 MG/1
300 TABLET ORAL EVERY MORNING
Status: DISCONTINUED | OUTPATIENT
Start: 2024-05-25 | End: 2024-05-28

## 2024-05-25 RX ORDER — METHYLPHENIDATE HYDROCHLORIDE 10 MG/1
10 TABLET ORAL 4 TIMES DAILY
Status: DISCONTINUED | OUTPATIENT
Start: 2024-05-25 | End: 2024-05-28

## 2024-05-25 RX ORDER — CITALOPRAM HYDROBROMIDE 20 MG/1
40 TABLET ORAL DAILY
Status: DISCONTINUED | OUTPATIENT
Start: 2024-05-25 | End: 2024-05-28

## 2024-05-25 RX ADMIN — METHYLPHENIDATE HYDROCHLORIDE 10 MG: 10 TABLET ORAL at 12:34

## 2024-05-25 RX ADMIN — CITALOPRAM HYDROBROMIDE 40 MG: 20 TABLET ORAL at 12:34

## 2024-05-25 RX ADMIN — HYDROMORPHONE HYDROCHLORIDE 2 MG: 2 TABLET ORAL at 04:26

## 2024-05-25 RX ADMIN — PANTOPRAZOLE SODIUM 40 MG: 40 TABLET, DELAYED RELEASE ORAL at 06:33

## 2024-05-25 RX ADMIN — PIPERACILLIN AND TAZOBACTAM 4.5 G: 4; .5 INJECTION, POWDER, FOR SOLUTION INTRAVENOUS at 08:35

## 2024-05-25 RX ADMIN — LEVOTHYROXINE SODIUM 100 MCG: 100 TABLET ORAL at 08:34

## 2024-05-25 RX ADMIN — POTASSIUM CHLORIDE 40 MEQ: 1500 TABLET, EXTENDED RELEASE ORAL at 10:09

## 2024-05-25 RX ADMIN — PIPERACILLIN AND TAZOBACTAM 4.5 G: 4; .5 INJECTION, POWDER, FOR SOLUTION INTRAVENOUS at 01:57

## 2024-05-25 RX ADMIN — HYDROMORPHONE HYDROCHLORIDE 2 MG: 2 TABLET ORAL at 00:26

## 2024-05-25 RX ADMIN — PIPERACILLIN AND TAZOBACTAM 4.5 G: 4; .5 INJECTION, POWDER, FOR SOLUTION INTRAVENOUS at 19:41

## 2024-05-25 RX ADMIN — BUPROPION HYDROCHLORIDE 300 MG: 150 TABLET, EXTENDED RELEASE ORAL at 12:34

## 2024-05-25 RX ADMIN — DORNASE ALFA 50 ML: 1 SOLUTION RESPIRATORY (INHALATION) at 20:53

## 2024-05-25 RX ADMIN — VANCOMYCIN HYDROCHLORIDE 1250 MG: 10 INJECTION, POWDER, LYOPHILIZED, FOR SOLUTION INTRAVENOUS at 16:25

## 2024-05-25 RX ADMIN — PIPERACILLIN AND TAZOBACTAM 4.5 G: 4; .5 INJECTION, POWDER, FOR SOLUTION INTRAVENOUS at 14:26

## 2024-05-25 RX ADMIN — PANTOPRAZOLE SODIUM 40 MG: 40 TABLET, DELAYED RELEASE ORAL at 16:23

## 2024-05-25 RX ADMIN — DORNASE ALFA 50 ML: 1 SOLUTION RESPIRATORY (INHALATION) at 12:40

## 2024-05-25 RX ADMIN — DEXTROSE MONOHYDRATE: 50 INJECTION, SOLUTION INTRAVENOUS at 10:08

## 2024-05-25 ASSESSMENT — ACTIVITIES OF DAILY LIVING (ADL)
ADLS_ACUITY_SCORE: 53
ADLS_ACUITY_SCORE: 54
ADLS_ACUITY_SCORE: 54
ADLS_ACUITY_SCORE: 53
ADLS_ACUITY_SCORE: 54
ADLS_ACUITY_SCORE: 53
ADLS_ACUITY_SCORE: 54
ADLS_ACUITY_SCORE: 53
ADLS_ACUITY_SCORE: 53
ADLS_ACUITY_SCORE: 54
ADLS_ACUITY_SCORE: 53
ADLS_ACUITY_SCORE: 54
ADLS_ACUITY_SCORE: 54
ADLS_ACUITY_SCORE: 53
ADLS_ACUITY_SCORE: 54

## 2024-05-25 NOTE — PHARMACY-VANCOMYCIN DOSING SERVICE
"Pharmacy Vancomycin Initial Note  Date of Service May 24, 2024  Patient's  1945  79 year old, female    Indication:  Empyema    Current estimated CrCl = Estimated Creatinine Clearance: 70.9 mL/min (based on SCr of 0.63 mg/dL).    Creatinine for last 3 days  2024:  1:42 PM Creatinine 0.63 mg/dL    Recent Vancomycin Level(s) for last 3 days  No results found for requested labs within last 3 days.      Vancomycin IV Administrations (past 72 hours)        No vancomycin orders with administrations in past 72 hours.                    Nephrotoxins and other renal medications (From now, onward)      Start     Dose/Rate Route Frequency Ordered Stop    24  vancomycin (VANCOCIN) 1,750 mg in 0.9% NaCl 500 mL intermittent infusion         1,750 mg  over 2 Hours Intravenous ONCE 24  piperacillin-tazobactam (ZOSYN) 4.5 g vial to attach to  mL bag        Note to Pharmacy: For [facility], [facility] and [facility]: For Zosyn-naive patients, use the \"Zosyn initial dose + extended infusion\" order panel.    4.5 g  over 30 Minutes Intravenous EVERY 6 HOURS 24              Contrast Orders - past 72 hours (72h ago, onward)      Start     Dose/Rate Route Frequency Stop    24 1510  iopamidol (ISOVUE-370) solution 61 mL         61 mL Intravenous ONCE 24 1515          [employer] Prediction of Planned Initial Vancomycin Regimen  Loading dose: 1750 mg at 21:00 2024.  Regimen: 1250 mg IV every 18 hours.  Start time: 15:00 on 2024  Exposure target: AUC24 (range)400-600 mg/L.hr   AUC24,ss: 495 mg/L.hr  Probability of AUC24 > 400: 73 %  Ctrough,ss: 14.2 mg/L  Probability of Ctrough,ss > 20: 23 %  Probability of nephrotoxicity (Lodise GEO ): 9 %        Plan:  Start vancomycin 1750 mg IV once followed by 1250 mg IV q18h.   Vancomycin monitoring method: AUC  Vancomycin therapeutic monitoring goal: 400-600 mg*h/L  Pharmacy will check vancomycin levels as appropriate in 1-3 " Days.    Serum creatinine levels will be ordered daily for the first week of therapy and at least twice weekly for subsequent weeks.      Belen Raygoza RPH

## 2024-05-25 NOTE — PROGRESS NOTES
[facility]    Medicine Progress Note - Hospitalist Service    Date of Admission:  2024    Assessment & Plan   Amita Yates is a 79 year old female with pmh of hypertension, depression, thyroid disease admitted on 2024 with respiratory failure from a large loculated pleural effusion/empyema.  She has a chest tube in place    Large left loculated pleural effusion/empyema, with rightward mediastinal shift  Acute hypoxic respiratory failure due to above   Sepsis due to above   P/w sob, leukocytosis, tachycardia and CXR of complete opacification of the left hemithorax  CT: Large loculated left pleural effusion with significant mass effect,  and rightward mediastinal shift. Mild smooth right pleural thickening.  Chest tube placed in the ED.  Temp (24hrs), Av.4  F (36.3  C), Min:97.2  F (36.2  C), Max:97.8  F (36.6  C)  Recent Labs   Lab 24  0602 24  1342   WBC 12.5* 17.9*      CRP Inflammation   Date Value Ref Range Status   2024 172.15 (H) <5.00 mg/L Final     Chest X-ray    Left basilar pigtail chest tube. Mild decrease in the volume of the adjacent pleural effusion. There is still some fluid along the lateral aspect of the left chest. There are increased interstitial lung markings. No pneumothorax. Unchanged cardiac size.  Continue chest tube   Continue oxygen   Continue antibiotics   Follow up cultures   Await further recommendations from thoracic surgery     AG/Lactic acidosis likely from sepsis, resolved  Continue intravenous fluid     FEN  Hypernatremia   Hypokalemia   Recent Labs   Lab 24  0602 24  1342   * 143      Recent Labs   Lab 24  0602 24  1342   POTASSIUM 3.3* 3.7      Change intravenous fluid to D5W  Replace K  Monitor electrolytes     Hypertension   Systolic (24hrs), Av , Min:113 , Max:143    Can hold amlodipine for now     Gastroesophageal reflux disease   Continue proton pump inhibitor  "    Hypothyroidism   Continue levothyroxine    Depression  Continue prior to admission bupropion, citalopram, methylphenidate     Osteoporosis  Hold prior to admission Forteo and Evenity         Diet: Combination Diet Regular Diet Adult    DVT Prophylaxis: Pneumatic Compression Devices  Mehta Catheter: Not present  Lines: None     Cardiac Monitoring: ACTIVE order. Indication: chest tube  Code Status: Full Code      Clinically Significant Risk Factors Present on Admission        # Hypokalemia: Lowest K = 3.3 mmol/L in last 2 days, will replace as needed  # Hypernatremia: Highest Na = 147 mmol/L in last 2 days, will monitor as appropriate     # Anion Gap Metabolic Acidosis: Highest Anion Gap = 19 mmol/L in last 2 days, will monitor and treat as appropriate  # Hypoalbuminemia: Lowest albumin = 2.4 g/dL at 5/25/2024  6:02 AM, will monitor as appropriate          # Overweight: Estimated body mass index is 26.64 kg/m  as calculated from the following:    Height as of this encounter: 1.626 m (5' 4\").    Weight as of this encounter: 70.4 kg (155 lb 3.3 oz).              Disposition Plan     Medically Ready for Discharge: > 5 days             Carlin Chau MD  Hospitalist Service  [facility]  Securely message with [employer] (more info)  Text page via [employer] Paging/Directory   ______________________________________________________________________    Interval History   She has been stable overnight. No complaints.    Physical Exam   Vital Signs: Temp: 97.2  F (36.2  C) Temp src: Oral BP: (!) 114/97 Pulse: 76   Resp: 17 SpO2: 97 % O2 Device: Nasal cannula Oxygen Delivery: 2 LPM  Weight: 155 lbs 3.26 oz  Constitutional: awake, alert, cooperative, no apparent distress  Cardiovascular: regular rate and rhythm, normal S1 and S2, no S3 or S4, and no murmur noted  Lungs- good bilateral  breath sounds  Neuropsychiatric: General: normal, calm and normal eye contact     Medical Decision Making "       MANAGEMENT DISCUSSED with the following over the past 24 hours: patient   NOTE(S)/MEDICAL RECORDS REVIEWED over the past 24 hours: EPIC       Data     I have personally reviewed the following data over the past 24 hrs:    12.5 (H)  \   12.4   / 576 (H)     147 (H) 109 (H) 24.0 (H) /  105 (H)   3.3 (L) 26 0.68 \     ALT: 50 AST: 56 (H) AP: 170 (H) TBILI: 0.5   ALB: 2.4 (L) TOT PROTEIN: 5.7 (L) LIPASE: N/A     TSH: 5.15 (H) T4: 0.87 (L) A1C: N/A     Procal: 0.24 CRP: 172.15 (H) Lactic Acid: 1.4       INR:  N/A PTT:  N/A   D-dimer:  1.17 (H) Fibrinogen:  N/A       Imaging results reviewed over the past 24 hrs:   Recent Results (from the past 24 hour(s))   XR Chest Port 1 View    Narrative    CHEST ONE VIEW  5/24/2024 1:50 PM     HISTORY: Hypoxic.    COMPARISON: None.      Impression    IMPRESSION: Opacified left hemithorax, if this is an unexpected  finding consider CT scan of the chest for further evaluation. Question  some minimal atelectasis and/or infiltrate at the right base.    TEZ AYOUB MD         SYSTEM ID:  [id number]   CT Chest Pulmonary Embolism w Contrast    Narrative    CT CHEST PULMONARY EMBOLISM W CONTRAST 5/24/2024 3:26 PM    CLINICAL HISTORY: chest pain  TECHNIQUE: CT angiogram chest during arterial phase injection IV  contrast. 2D and 3D MIP reconstructions were performed by the CT  technologist. Dose reduction techniques were used.     CONTRAST: 61mL Isovue-370    COMPARISON: Chest x-ray earlier today    FINDINGS:  ANGIOGRAM CHEST: Pulmonary arteries are normal caliber and negative  for pulmonary emboli. Thoracic aorta is negative for dissection. No CT  evidence of right heart strain.    LUNGS AND PLEURA: Large loculated left pleural effusion with near  complete atelectasis of the left lung. There is significant mass  effect with resultant rightward mediastinal shift. The effusion has  some mild smooth wall thickening without obvious nodular pleural based  masses.. Trace right pleural  effusion. Mild linear interlobular septal  thickening and linear and patchy opacities scattered in the right  lung, likely due to edema or infection.    MEDIASTINUM/AXILLAE: No lymphadenopathy. No thoracic aortic aneurysm  moderate coronary artery calcifications. No pericardial effusion.  Moderate-sized hiatal hernia.    UPPER ABDOMEN: Small calcifications in the left adrenal gland, likely  the sequela of chronic infection. Pancreatic parenchymal atrophy.  Cholecystectomy.    MUSCULOSKELETAL: No suspicious lesions in the bones. Degenerative  changes in the spine.      Impression    IMPRESSION:  1.  Large loculated left pleural effusion with significant mass effect  and rightward mediastinal shift. Mild smooth right pleural thickening.  Findings are indeterminate, and may be infectious/inflammatory,  empyema or malignant effusion.  2.  Trace right pleural effusion and mild right lung edema/infiltrate.  3.  Moderate size hiatal hernia containing the upper half of the  stomach.    LUCAS BISHOP MD         SYSTEM ID:  [id number]   XR Chest Port 1 View    Narrative    EXAM: XR CHEST PORT 1 VIEW  LOCATION: [facility]  DATE: 5/24/2024    INDICATION: fu chest tube placement  COMPARISON: 5/24/2024      Impression    IMPRESSION: Left lower chest tube appropriately positioned with significant improvement in the pleural effusion which is now moderate in size. No right pleural effusion. Calcified aortic knob. Generalized bony demineralization.   XR Chest Port 1 View    Narrative    EXAM: XR CHEST PORT 1 VIEW  LOCATION: [facility]  DATE: 5/25/2024    INDICATION: fu chest tube and empyema  COMPARISON: 5/24/2024      Impression    IMPRESSION: Left basilar pigtail chest tube. Mild decrease in the volume of the adjacent pleural effusion. There is still some fluid along the lateral aspect of the left chest. There are increased interstitial lung markings. No pneumothorax. Unchanged    cardiac size.

## 2024-05-25 NOTE — ED NOTES
Patient chest tube clamped per [facility] DON.  CT output 2100ml  in 2.5 hours, clamp for one hour, would recommend unclamp and watch output.  Consult with IR MD if output is greater the 500ml in an hour.     Chest Tube clamped at 1930 unclamp @2030    Pain with dilaudid is now 5/10

## 2024-05-25 NOTE — PROGRESS NOTES
THORACIC SURGERY CONSULT    80 yo woman  CT chest showed large left pleural effusion with mediastinal shift to the right  CT placed  >2000 ml pus drained    Gram + cocci    CXR this am adequate drainage with residual fluid at the base    Recommend lytics  order written    Will follow    IVET DUPONT MD [facility] ONCOLOGY THORACIC SURGERY  CELL:  [phone]  OFFICE: [phone]

## 2024-05-25 NOTE — PLAN OF CARE
Goal Outcome Evaluation:      Plan of Care Reviewed With: patient    Overall Patient Progress: improvingOverall Patient Progress: improving    Outcome Evaluation: Pt remained stable during this shift.    [id number]    Orientations: A/Ox4, [place]' (hearing aids on the bedside table, pt reads lip and write in the paper).  Vitals/Pain: VSS and on 2L NC. L CT site pain managed w/ reposition and PRN dilaudid.  Tele:  SR  Lines/Drains: PIV L AC infusing cont NS at 100ml/hr w/ intermittent IV abx. Pt's CT was clamped for an hour from 1930 to 2030. CT w/ water seal for 2hrs from 3095-7532. From 2300, CT connected back to -20 suction.  Skin/Wounds: L CT(w/ -20 suction and creamy output), blanchable redness at william elbow/coccyx/perineum, scattered bruises, abrasion on R knee and dry/pale skin.  GI/: Incontinent of bladder(placed pure wick overnight)  Labs: Abnormal/Trends, Electrolyte Replacement- Lactic acid q6h.Gram stain result(collected on 5/24/2024 at 1638) from pleural fluid was 4+gram cocci in pairs/chains and 4+WBC, MD aware.  Ambulation/Assist: Pt didn't get OOB, repositioned q2h and placed mepilex at bony area. Pt refused to stand up and get OOB this morning at 0600.(Pt said pt is feeling very weak and not at this time).  Sleep Quality: Slept well overnight  Plan: IV abx,pain management and monitor CT output.

## 2024-05-25 NOTE — PLAN OF CARE
Orientation: A/Ox4    Vitals/Tele: VSS - LSD 2L NC, SR, Normo/HTN, Afebrile    IV Access/drains: L CT site, 2 PIV    Diet: Regular - fair appetite    Mobility: Ax2 GB/W, q2hr turns    GI/: Incontinent of B/B, BS audible, Straight cath for retention    Wound/Skin: Wound to sacrum, scattered bruising    Consults: Thoracic surg    Discharge Plan: Pending      See Flow sheets for assessment        Goal Outcome Evaluation:

## 2024-05-25 NOTE — PROVIDER NOTIFICATION
MD Notification    Notified Person: MD    Notified Person Name: Dr. Chau    Notification Date/Time: 5/25 1330    Notification Interaction: vocera message    Purpose of Notification: Positive cultures    Orders Received: no new orders     Comments:

## 2024-05-26 ENCOUNTER — APPOINTMENT (OUTPATIENT)
Dept: GENERAL RADIOLOGY | Facility: CLINIC | Age: 79
DRG: 871 | End: 2024-05-26
Attending: THORACIC SURGERY (CARDIOTHORACIC VASCULAR SURGERY)
Payer: COMMERCIAL

## 2024-05-26 LAB
ANION GAP SERPL CALCULATED.3IONS-SCNC: 8 MMOL/L (ref 7–15)
BASOPHILS # BLD AUTO: 0.1 10E3/UL (ref 0–0.2)
BASOPHILS NFR BLD AUTO: 1 %
BUN SERPL-MCNC: 16.5 MG/DL (ref 8–23)
CALCIUM SERPL-MCNC: 7.8 MG/DL (ref 8.8–10.2)
CHLORIDE SERPL-SCNC: 105 MMOL/L (ref 98–107)
CREAT SERPL-MCNC: 0.69 MG/DL (ref 0.51–0.95)
DEPRECATED HCO3 PLAS-SCNC: 25 MMOL/L (ref 22–29)
EGFRCR SERPLBLD CKD-EPI 2021: 88 ML/MIN/1.73M2
EOSINOPHIL # BLD AUTO: 0.1 10E3/UL (ref 0–0.7)
EOSINOPHIL NFR BLD AUTO: 1 %
ERYTHROCYTE [DISTWIDTH] IN BLOOD BY AUTOMATED COUNT: 15.5 % (ref 10–15)
GLUCOSE SERPL-MCNC: 174 MG/DL (ref 70–99)
HCT VFR BLD AUTO: 34.4 % (ref 35–47)
HGB BLD-MCNC: 10.7 G/DL (ref 11.7–15.7)
IMM GRANULOCYTES # BLD: 0.3 10E3/UL
IMM GRANULOCYTES NFR BLD: 3 %
LACTATE SERPL-SCNC: 1.2 MMOL/L (ref 0.7–2)
LACTATE SERPL-SCNC: 1.4 MMOL/L (ref 0.7–2)
LACTATE SERPL-SCNC: 1.7 MMOL/L (ref 0.7–2)
LACTATE SERPL-SCNC: 2.5 MMOL/L (ref 0.7–2)
LYMPHOCYTES # BLD AUTO: 2.4 10E3/UL (ref 0.8–5.3)
LYMPHOCYTES NFR BLD AUTO: 25 %
MCH RBC QN AUTO: 27.9 PG (ref 26.5–33)
MCHC RBC AUTO-ENTMCNC: 31.1 G/DL (ref 31.5–36.5)
MCV RBC AUTO: 90 FL (ref 78–100)
MONOCYTES # BLD AUTO: 1.2 10E3/UL (ref 0–1.3)
MONOCYTES NFR BLD AUTO: 12 %
NEUTROPHILS # BLD AUTO: 5.7 10E3/UL (ref 1.6–8.3)
NEUTROPHILS NFR BLD AUTO: 58 %
NRBC # BLD AUTO: 0 10E3/UL
NRBC BLD AUTO-RTO: 0 /100
PLATELET # BLD AUTO: 501 10E3/UL (ref 150–450)
POTASSIUM SERPL-SCNC: 3.4 MMOL/L (ref 3.4–5.3)
POTASSIUM SERPL-SCNC: 4 MMOL/L (ref 3.4–5.3)
RBC # BLD AUTO: 3.84 10E6/UL (ref 3.8–5.2)
SODIUM SERPL-SCNC: 138 MMOL/L (ref 135–145)
WBC # BLD AUTO: 9.7 10E3/UL (ref 4–11)

## 2024-05-26 PROCEDURE — 120N000013 HC R&B IMCU

## 2024-05-26 PROCEDURE — 85049 AUTOMATED PLATELET COUNT: CPT | Performed by: HOSPITALIST

## 2024-05-26 PROCEDURE — 71045 X-RAY EXAM CHEST 1 VIEW: CPT

## 2024-05-26 PROCEDURE — 250N000013 HC RX MED GY IP 250 OP 250 PS 637: Performed by: HOSPITALIST

## 2024-05-26 PROCEDURE — 250N000009 HC RX 250: Performed by: THORACIC SURGERY (CARDIOTHORACIC VASCULAR SURGERY)

## 2024-05-26 PROCEDURE — 258N000003 HC RX IP 258 OP 636: Performed by: THORACIC SURGERY (CARDIOTHORACIC VASCULAR SURGERY)

## 2024-05-26 PROCEDURE — 250N000011 HC RX IP 250 OP 636: Performed by: HOSPITALIST

## 2024-05-26 PROCEDURE — 99232 SBSQ HOSP IP/OBS MODERATE 35: CPT | Performed by: HOSPITALIST

## 2024-05-26 PROCEDURE — 83605 ASSAY OF LACTIC ACID: CPT | Performed by: HOSPITALIST

## 2024-05-26 PROCEDURE — 250N000011 HC RX IP 250 OP 636: Mod: JZ | Performed by: THORACIC SURGERY (CARDIOTHORACIC VASCULAR SURGERY)

## 2024-05-26 PROCEDURE — 36415 COLL VENOUS BLD VENIPUNCTURE: CPT | Performed by: HOSPITALIST

## 2024-05-26 PROCEDURE — 84132 ASSAY OF SERUM POTASSIUM: CPT | Performed by: HOSPITALIST

## 2024-05-26 PROCEDURE — 80048 BASIC METABOLIC PNL TOTAL CA: CPT | Performed by: HOSPITALIST

## 2024-05-26 PROCEDURE — 258N000003 HC RX IP 258 OP 636: Performed by: HOSPITALIST

## 2024-05-26 RX ORDER — SODIUM CHLORIDE 9 MG/ML
INJECTION, SOLUTION INTRAVENOUS CONTINUOUS
Status: DISCONTINUED | OUTPATIENT
Start: 2024-05-26 | End: 2024-05-27

## 2024-05-26 RX ORDER — POTASSIUM CHLORIDE 1.5 G/1.58G
40 POWDER, FOR SOLUTION ORAL ONCE
Status: COMPLETED | OUTPATIENT
Start: 2024-05-26 | End: 2024-05-26

## 2024-05-26 RX ADMIN — PIPERACILLIN AND TAZOBACTAM 4.5 G: 4; .5 INJECTION, POWDER, FOR SOLUTION INTRAVENOUS at 15:46

## 2024-05-26 RX ADMIN — SODIUM CHLORIDE: 9 INJECTION, SOLUTION INTRAVENOUS at 22:25

## 2024-05-26 RX ADMIN — POTASSIUM CHLORIDE 40 MEQ: 1.5 POWDER, FOR SOLUTION ORAL at 08:48

## 2024-05-26 RX ADMIN — DORNASE ALFA 50 ML: 1 SOLUTION RESPIRATORY (INHALATION) at 21:10

## 2024-05-26 RX ADMIN — PIPERACILLIN AND TAZOBACTAM 4.5 G: 4; .5 INJECTION, POWDER, FOR SOLUTION INTRAVENOUS at 08:50

## 2024-05-26 RX ADMIN — SODIUM CHLORIDE: 9 INJECTION, SOLUTION INTRAVENOUS at 15:49

## 2024-05-26 RX ADMIN — PANTOPRAZOLE SODIUM 40 MG: 40 TABLET, DELAYED RELEASE ORAL at 15:46

## 2024-05-26 RX ADMIN — DORNASE ALFA 50 ML: 1 SOLUTION RESPIRATORY (INHALATION) at 08:55

## 2024-05-26 RX ADMIN — PIPERACILLIN AND TAZOBACTAM 4.5 G: 4; .5 INJECTION, POWDER, FOR SOLUTION INTRAVENOUS at 01:40

## 2024-05-26 RX ADMIN — PIPERACILLIN AND TAZOBACTAM 4.5 G: 4; .5 INJECTION, POWDER, FOR SOLUTION INTRAVENOUS at 19:49

## 2024-05-26 RX ADMIN — DEXTROSE MONOHYDRATE: 50 INJECTION, SOLUTION INTRAVENOUS at 03:44

## 2024-05-26 RX ADMIN — PANTOPRAZOLE SODIUM 40 MG: 40 TABLET, DELAYED RELEASE ORAL at 06:03

## 2024-05-26 RX ADMIN — LEVOTHYROXINE SODIUM 100 MCG: 100 TABLET ORAL at 08:28

## 2024-05-26 RX ADMIN — SODIUM CHLORIDE 500 ML: 9 INJECTION, SOLUTION INTRAVENOUS at 14:14

## 2024-05-26 ASSESSMENT — ACTIVITIES OF DAILY LIVING (ADL)
ADLS_ACUITY_SCORE: 54

## 2024-05-26 NOTE — PROGRESS NOTES
THORACIC SURGERY    Adequate drainage after 2 doses of lytics    CXR this amm improved aeration left lung  Adequate drainage left pleural space    CT suction    Continue lytics    IVET DUPONT MD [facility] ONCOLOGY THORACIC SURGERY  CELL:  [phone]  OFFICE: [phone]

## 2024-05-26 NOTE — PLAN OF CARE
Goal Outcome Evaluation:      Plan of Care Reviewed With: patient    Overall Patient Progress: improvingOverall Patient Progress: improving    Outcome Evaluation: Pt denied pain. CT output is decreased.    [id number]    Orientations: A/O X4  Vitals/Pain: VSS and on 2L NC. Denied pain  Tele: SR  Lines/Drains: R PIV infusing D5 at 75ml/hr. CT w/ suction -20.  Skin/Wounds: skin breakdown/reddened perineum, abrasion at R knee and scattered bruises.L CT site.  GI/: Incontinent of bladder. No BM during this shift, active BS.  Labs: Abnormal/Trends, Electrolyte Replacement- K protocol  Ambulation/Assist: Lift, reposition q2h.  Sleep Quality: Slept well overnight  Plan: discharge pending.

## 2024-05-26 NOTE — PROGRESS NOTES
[facility]    Medicine Progress Note - Hospitalist Service    Date of Admission:  5/24/2024    Assessment & Plan   Amita Yates is a 79 year old female with pmh of hypertension, depression, thyroid disease admitted on 5/24/2024 with respiratory failure from a large loculated pleural effusion/empyema.  She has a chest tube in place    Large left loculated pleural effusion/empyema, with rightward mediastinal shift  Acute hypoxic respiratory failure due to above   Sepsis due to above and possibly GPC bacteremia  P/w sob, leukocytosis, tachycardia and CXR of complete opacification of the left hemithorax  CT: Large loculated left pleural effusion with significant mass effect,  and rightward mediastinal shift. Mild smooth right pleural thickening.  Chest tube placed in the ED and is being managed by the CT surgeon.    Blood culture is growing 1/2 bottles GPC with negative Verigene.  Fluid culture is growing strept intermedius.    Recent Labs   Lab 05/26/24  0615 05/25/24  0602 05/24/24  1342   WBC 9.7 12.5* 17.9*      CRP Inflammation   Date Value Ref Range Status   05/25/2024 172.15 (H) <5.00 mg/L Final     Chest X-ray 5/26  IMPRESSION: Small caliber left pleural pigtail catheter, unchanged. Improved aeration of the left lung with diminishing left basilar opacities and associated effusion seen previously. Minor strands of linear atelectasis right base. No effusion on the   right. Normal cardiac size and pulmonary vascularity. Atherosclerotic thoracic aorta. Degenerative changes both shoulders and the spine. Thoracolumbar curve, partially visualized. Monitoring leads overlying the chest.  Continue chest tube per CT surgeon   Continue oxygen   Continue Zosyn and consult infectious disease   Follow up final cultures     AG/Lactic acidosis likely from sepsis  Bolus ordered  Continue to monitor     FEN  Hypernatremia - resolved   Hypokalemia - corrected   Recent Labs   Lab 05/26/24  0615  "24  0602 24  1342    147* 143      Recent Labs   Lab 24  1223 24  0615 24  1555 24  0602 24  1342   POTASSIUM 4.0 3.4 3.7 3.3* 3.7      Change intravenous fluid from D5W to NS  Monitor electrolytes     Hypertension   Systolic (24hrs), Av , Min:97 , Max:121    Can hold amlodipine for now     Gastroesophageal reflux disease   Continue proton pump inhibitor     Hypothyroidism   Continue levothyroxine    Depression  Apparently she has not been taking her medications.  Continue to hold prior to admission bupropion, citalopram, methylphenidate   Consider asking the psychiatry service to see her Tuesday     Osteoporosis  Hold prior to admission Forteo and Evenity         Diet: Combination Diet Regular Diet Adult    DVT Prophylaxis: Pneumatic Compression Devices  Mehta Catheter: Not present  Lines: None     Cardiac Monitoring: ACTIVE order. Indication: C  Code Status: Full Code      Clinically Significant Risk Factors        # Hypokalemia: Lowest K = 3.3 mmol/L in last 2 days, will replace as needed  # Hypernatremia: Highest Na = 147 mmol/L in last 2 days, will monitor as appropriate      # Hypoalbuminemia: Lowest albumin = 2.4 g/dL at 2024  6:02 AM, will monitor as appropriate            # Overweight: Estimated body mass index is 26.64 kg/m  as calculated from the following:    Height as of this encounter: 1.626 m (5' 4\").    Weight as of this encounter: 70.4 kg (155 lb 3.3 oz)., PRESENT ON ADMISSION            Disposition Plan     Medically Ready for Discharge: > 5 days       Carlin Chau MD  Hospitalist Service  [facility]  Securely message with [employer] (more info)  Text page via [employer] Paging/Directory   ______________________________________________________________________    Interval History   She has been stable overnight. No complaints.    Physical Exam   Vital Signs: Temp: 97.2  F (36.2  C) Temp src: Oral BP: 97/70 Pulse: " 96   Resp: 20 SpO2: 95 % O2 Device: Nasal cannula Oxygen Delivery: 2 LPM  Weight: 155 lbs 3.26 oz  Constitutional: awake, alert, cooperative, no apparent distress  Cardiovascular: regular rate and rhythm, normal S1 and S2, no S3 or S4, and no murmur noted  Lungs- good bilateral  breath sounds  Neuropsychiatric: General: normal, calm and normal eye contact     Medical Decision Making       MANAGEMENT DISCUSSED with the following over the past 24 hours: patient   NOTE(S)/MEDICAL RECORDS REVIEWED over the past 24 hours: EPIC       Data     I have personally reviewed the following data over the past 24 hrs:    9.7  \   10.7 (L)   / 501 (H)     138 105 16.5 /  174 (H)   4.0 25 0.69 \     Procal: N/A CRP: N/A Lactic Acid: 2.5 (H)         Imaging results reviewed over the past 24 hrs:   Recent Results (from the past 24 hour(s))   XR Chest Port 1 View    Narrative    EXAM: XR CHEST PORTABLE 1 VIEW  LOCATION: [facility]  DATE: 05/26/2024    INDICATION: Follow-up chest tube and empyema.  COMPARISON: Portable chest single view 05/25/2024 at 0651 hours.      Impression    IMPRESSION: Small caliber left pleural pigtail catheter, unchanged. Improved aeration of the left lung with diminishing left basilar opacities and associated effusion seen previously. Minor strands of linear atelectasis right base. No effusion on the   right. Normal cardiac size and pulmonary vascularity. Atherosclerotic thoracic aorta. Degenerative changes both shoulders and the spine. Thoracolumbar curve, partially visualized. Monitoring leads overlying the chest.

## 2024-05-26 NOTE — PLAN OF CARE
Orientation: A/Ox4     Vitals/Tele: VSS - LSD 2L NC, SR, Normo/Hypot, Afebrile     IV Access/drains: L CT site, 1 PIV     Diet: Regular - fair appetite     Mobility: Ax2 GB/W, q2hr turns     GI/: Incontinent of B/B, BS audible, Purewick in place     Wound/Skin: Wound to sacrum, scattered bruising     Consults: Thoracic surg, ID     Discharge Plan: Pending        See Flow sheets for assessment               Goal Outcome Evaluation:

## 2024-05-27 ENCOUNTER — APPOINTMENT (OUTPATIENT)
Dept: GENERAL RADIOLOGY | Facility: CLINIC | Age: 79
DRG: 871 | End: 2024-05-27
Attending: THORACIC SURGERY (CARDIOTHORACIC VASCULAR SURGERY)
Payer: COMMERCIAL

## 2024-05-27 LAB
ANION GAP SERPL CALCULATED.3IONS-SCNC: 8 MMOL/L (ref 7–15)
BACTERIA BLD CULT: ABNORMAL
BACTERIA BLD CULT: ABNORMAL
BACTERIA PLR CULT: ABNORMAL
BASOPHILS # BLD AUTO: ABNORMAL 10*3/UL
BASOPHILS # BLD MANUAL: 0.4 10E3/UL (ref 0–0.2)
BASOPHILS NFR BLD AUTO: ABNORMAL %
BASOPHILS NFR BLD MANUAL: 4 %
BUN SERPL-MCNC: 9.6 MG/DL (ref 8–23)
CALCIUM SERPL-MCNC: 7.8 MG/DL (ref 8.8–10.2)
CHLORIDE SERPL-SCNC: 108 MMOL/L (ref 98–107)
CREAT SERPL-MCNC: 0.65 MG/DL (ref 0.51–0.95)
DEPRECATED HCO3 PLAS-SCNC: 26 MMOL/L (ref 22–29)
EGFRCR SERPLBLD CKD-EPI 2021: 90 ML/MIN/1.73M2
EOSINOPHIL # BLD AUTO: ABNORMAL 10*3/UL
EOSINOPHIL # BLD MANUAL: 0 10E3/UL (ref 0–0.7)
EOSINOPHIL NFR BLD AUTO: ABNORMAL %
EOSINOPHIL NFR BLD MANUAL: 0 %
ERYTHROCYTE [DISTWIDTH] IN BLOOD BY AUTOMATED COUNT: 15.6 % (ref 10–15)
GLUCOSE SERPL-MCNC: 130 MG/DL (ref 70–99)
GRAM STAIN RESULT: ABNORMAL
GRAM STAIN RESULT: ABNORMAL
HCT VFR BLD AUTO: 38.2 % (ref 35–47)
HGB BLD-MCNC: 12 G/DL (ref 11.7–15.7)
IMM GRANULOCYTES # BLD: ABNORMAL 10*3/UL
IMM GRANULOCYTES NFR BLD: ABNORMAL %
LACTATE SERPL-SCNC: 1 MMOL/L (ref 0.7–2)
LACTATE SERPL-SCNC: 1 MMOL/L (ref 0.7–2)
LACTATE SERPL-SCNC: 1.1 MMOL/L (ref 0.7–2)
LACTATE SERPL-SCNC: 1.7 MMOL/L (ref 0.7–2)
LYMPHOCYTES # BLD AUTO: ABNORMAL 10*3/UL
LYMPHOCYTES # BLD MANUAL: 1.2 10E3/UL (ref 0.8–5.3)
LYMPHOCYTES NFR BLD AUTO: ABNORMAL %
LYMPHOCYTES NFR BLD MANUAL: 12 %
MCH RBC QN AUTO: 27.9 PG (ref 26.5–33)
MCHC RBC AUTO-ENTMCNC: 31.4 G/DL (ref 31.5–36.5)
MCV RBC AUTO: 89 FL (ref 78–100)
METAMYELOCYTES # BLD MANUAL: 0.1 10E3/UL
METAMYELOCYTES NFR BLD MANUAL: 1 %
MONOCYTES # BLD AUTO: ABNORMAL 10*3/UL
MONOCYTES # BLD MANUAL: 0.5 10E3/UL (ref 0–1.3)
MONOCYTES NFR BLD AUTO: ABNORMAL %
MONOCYTES NFR BLD MANUAL: 5 %
MYELOCYTES # BLD MANUAL: 0.3 10E3/UL
MYELOCYTES NFR BLD MANUAL: 3 %
NEUTROPHILS # BLD AUTO: ABNORMAL 10*3/UL
NEUTROPHILS # BLD MANUAL: 7.6 10E3/UL (ref 1.6–8.3)
NEUTROPHILS NFR BLD AUTO: ABNORMAL %
NEUTROPHILS NFR BLD MANUAL: 75 %
NRBC # BLD AUTO: 0 10E3/UL
NRBC BLD AUTO-RTO: 0 /100
PLAT MORPH BLD: ABNORMAL
PLATELET # BLD AUTO: 469 10E3/UL (ref 150–450)
POTASSIUM SERPL-SCNC: 3.5 MMOL/L (ref 3.4–5.3)
RBC # BLD AUTO: 4.3 10E6/UL (ref 3.8–5.2)
RBC MORPH BLD: ABNORMAL
SMUDGE CELLS BLD QL SMEAR: PRESENT
SODIUM SERPL-SCNC: 142 MMOL/L (ref 135–145)
WBC # BLD AUTO: 10.1 10E3/UL (ref 4–11)

## 2024-05-27 PROCEDURE — 99232 SBSQ HOSP IP/OBS MODERATE 35: CPT | Performed by: HOSPITALIST

## 2024-05-27 PROCEDURE — 120N000013 HC R&B IMCU

## 2024-05-27 PROCEDURE — 250N000011 HC RX IP 250 OP 636: Performed by: HOSPITALIST

## 2024-05-27 PROCEDURE — 250N000011 HC RX IP 250 OP 636: Mod: JZ | Performed by: THORACIC SURGERY (CARDIOTHORACIC VASCULAR SURGERY)

## 2024-05-27 PROCEDURE — 83605 ASSAY OF LACTIC ACID: CPT | Performed by: HOSPITALIST

## 2024-05-27 PROCEDURE — 258N000003 HC RX IP 258 OP 636: Performed by: THORACIC SURGERY (CARDIOTHORACIC VASCULAR SURGERY)

## 2024-05-27 PROCEDURE — 250N000011 HC RX IP 250 OP 636: Performed by: INTERNAL MEDICINE

## 2024-05-27 PROCEDURE — 36415 COLL VENOUS BLD VENIPUNCTURE: CPT | Performed by: HOSPITALIST

## 2024-05-27 PROCEDURE — 80048 BASIC METABOLIC PNL TOTAL CA: CPT | Performed by: HOSPITALIST

## 2024-05-27 PROCEDURE — 250N000009 HC RX 250: Performed by: THORACIC SURGERY (CARDIOTHORACIC VASCULAR SURGERY)

## 2024-05-27 PROCEDURE — 258N000003 HC RX IP 258 OP 636: Performed by: HOSPITALIST

## 2024-05-27 PROCEDURE — 71045 X-RAY EXAM CHEST 1 VIEW: CPT

## 2024-05-27 PROCEDURE — 250N000013 HC RX MED GY IP 250 OP 250 PS 637: Performed by: HOSPITALIST

## 2024-05-27 PROCEDURE — 85014 HEMATOCRIT: CPT | Performed by: HOSPITALIST

## 2024-05-27 PROCEDURE — 85007 BL SMEAR W/DIFF WBC COUNT: CPT | Performed by: HOSPITALIST

## 2024-05-27 PROCEDURE — 99222 1ST HOSP IP/OBS MODERATE 55: CPT | Performed by: INTERNAL MEDICINE

## 2024-05-27 RX ORDER — POTASSIUM CHLORIDE 1500 MG/1
20 TABLET, EXTENDED RELEASE ORAL ONCE
Status: COMPLETED | OUTPATIENT
Start: 2024-05-27 | End: 2024-05-27

## 2024-05-27 RX ORDER — SODIUM CHLORIDE 9 MG/ML
INJECTION, SOLUTION INTRAVENOUS CONTINUOUS
Status: ACTIVE | OUTPATIENT
Start: 2024-05-27 | End: 2024-05-27

## 2024-05-27 RX ORDER — CEFTRIAXONE 2 G/1
2 INJECTION, POWDER, FOR SOLUTION INTRAMUSCULAR; INTRAVENOUS EVERY 24 HOURS
Status: DISCONTINUED | OUTPATIENT
Start: 2024-05-27 | End: 2024-05-31 | Stop reason: HOSPADM

## 2024-05-27 RX ADMIN — PIPERACILLIN AND TAZOBACTAM 4.5 G: 4; .5 INJECTION, POWDER, FOR SOLUTION INTRAVENOUS at 02:00

## 2024-05-27 RX ADMIN — DORNASE ALFA 50 ML: 1 SOLUTION RESPIRATORY (INHALATION) at 09:25

## 2024-05-27 RX ADMIN — LEVOTHYROXINE SODIUM 100 MCG: 100 TABLET ORAL at 09:18

## 2024-05-27 RX ADMIN — DORNASE ALFA 50 ML: 1 SOLUTION RESPIRATORY (INHALATION) at 21:08

## 2024-05-27 RX ADMIN — SODIUM CHLORIDE: 9 INJECTION, SOLUTION INTRAVENOUS at 11:05

## 2024-05-27 RX ADMIN — POTASSIUM CHLORIDE 20 MEQ: 1500 TABLET, EXTENDED RELEASE ORAL at 11:07

## 2024-05-27 RX ADMIN — CEFTRIAXONE SODIUM 2 G: 2 INJECTION, POWDER, FOR SOLUTION INTRAMUSCULAR; INTRAVENOUS at 09:17

## 2024-05-27 RX ADMIN — PANTOPRAZOLE SODIUM 40 MG: 40 TABLET, DELAYED RELEASE ORAL at 06:06

## 2024-05-27 RX ADMIN — PANTOPRAZOLE SODIUM 40 MG: 40 TABLET, DELAYED RELEASE ORAL at 17:01

## 2024-05-27 ASSESSMENT — ACTIVITIES OF DAILY LIVING (ADL)
ADLS_ACUITY_SCORE: 59
ADLS_ACUITY_SCORE: 54
ADLS_ACUITY_SCORE: 59
ADLS_ACUITY_SCORE: 58
ADLS_ACUITY_SCORE: 59
ADLS_ACUITY_SCORE: 54
ADLS_ACUITY_SCORE: 58
ADLS_ACUITY_SCORE: 59
ADLS_ACUITY_SCORE: 56
ADLS_ACUITY_SCORE: 56
ADLS_ACUITY_SCORE: 59
ADLS_ACUITY_SCORE: 58
ADLS_ACUITY_SCORE: 56
ADLS_ACUITY_SCORE: 56
ADLS_ACUITY_SCORE: 58
ADLS_ACUITY_SCORE: 56
ADLS_ACUITY_SCORE: 58
ADLS_ACUITY_SCORE: 59
ADLS_ACUITY_SCORE: 58
ADLS_ACUITY_SCORE: 56
ADLS_ACUITY_SCORE: 56

## 2024-05-27 NOTE — CONSULTS
[facility]    Infectious Disease Consultation     Date of Admission:  5/24/2024  Date of Consult (When I saw the patient): 05/27/24    Assessment & Plan   Amita Yates is a 79 year old who was admitted on 5/24/2024.     Impression: 1 79-year-old female, acute left empyema, strep intermedius and cultures, now with chest tube and lytics with progressive clinical improvement  2 positive blood culture for Aerococcus viridans likely contaminant, conceivably part of the pleural infection if so covered by same antibiotics    REc 1 simplify to ceftriaxone while here, eventual oral options if well-drained and doing well.  This organism can be challenging with only tube drainage so I will certainly IV antibiotics while here        Blair Jeffries MD    Reason for Consult   Reason for consult: I was asked to evaluate this patient for left empyema.    Primary Care Physician   Richard Boss    Chief Complaint   Left chest pain    History is obtained from the patient and medical records    History of Present Illness   Amita Yates is a 79 year old female who presents with sepsis symptoms and left pleuritic pain found to have a left empyema.  Had not had an obvious preceding pneumonia.  No history of aspiration or similar.  She is now had aspiration of the fluid that proved to be infected looking and has been on broad antibiotics.  The cultures are coming back with strep intermedius and pure culture.  Had 1 blood culture growing Aerococcus viridans, she is clinically improved chest tube in place with lytics successfully draining the area quite well.    Past Medical History   I have reviewed this patient's medical history and updated it with pertinent information if needed.   Past Medical History:   Diagnosis Date    Cataract     Depression     Fracture     forearm    Heart murmur     Hypertension     borderline       Past Surgical History   I have reviewed this patient's surgical history and  updated it with pertinent information if needed.  Past Surgical History:   Procedure Laterality Date    HYSTERECTOMY RADICAL      INCISION LIMBAL RELAXING, KERATOTOMY ARCUATE  10/15/2012    Procedure: INCISION LIMBAL RELAXING, KERATOTOMY ARCUATE;;  Surgeon: Louie Moulton MD;  Location:  [facility]    PHACOEMULSIFICATION CLEAR CORNEA WITH STANDARD INTRAOCULAR LENS IMPLANT  10/1/2012    Procedure: PHACOEMULSIFICATION CLEAR CORNEA WITH STANDARD INTRAOCULAR LENS IMPLANT;  LEFT PHACOEMULSIFICATION CLEAR CORNEA WITH STANDARD INTRAOCULAR LENS IMPLANT;  Surgeon: Louie Moulton MD;  Location:  [facility]    PHACOEMULSIFICATION CLEAR CORNEA WITH STANDARD INTRAOCULAR LENS IMPLANT  10/15/2012    Procedure: PHACOEMULSIFICATION CLEAR CORNEA WITH STANDARD INTRAOCULAR LENS IMPLANT;  RIGHT PHACOEMULSIFICATION CLEAR CORNEA WITH STANDARD INTRAOCULAR LENS IMPLANT AND LIMBAL RELAXING INCISION;  Surgeon: Louie Moulton MD;  Location: [facility]       Prior to Admission Medications   Prior to Admission Medications   Prescriptions Last Dose Informant Patient Reported? Taking?   amLODIPine (NORVASC) 10 MG tablet Unknown  Yes Yes   Sig: Take 10 mg by mouth daily   buPROPion (WELLBUTRIN XL) 300 MG 24 hr tablet Unknown  Yes Yes   Sig: Take 300 mg by mouth every morning   citalopram (CELEXA) 40 MG tablet Unknown  Yes Yes   Sig: Take 40 mg by mouth daily   esomeprazole (NEXIUM) 40 MG DR capsule Unknown  Yes Yes   Sig: Take 40 mg by mouth 2 times daily Take 30-60 minutes before eating.   levothyroxine (SYNTHROID/LEVOTHROID) 100 MCG tablet Unknown  Yes Yes   Sig: Take 100 mcg by mouth daily   methylphenidate (RITALIN) 10 MG tablet Unknown  Yes Yes   Sig: Take 10 mg by mouth 4 times daily   romosozumab-aqqg (EVENITY) 105 MG/1.17ML SOSY injection 5/1/2024  Yes Yes   Sig: Inject 210 mg Subcutaneous every 30 days   teriparatide, recombinant, (FORTEO) 600 MCG/2.4ML SOPN injection Not Taking  Yes No   Sig: Inject 20 mcg Subcutaneous daily   Patient not taking:  "Reported on 5/25/2024      Facility-Administered Medications: None     Allergies   Allergies   Allergen Reactions    Evenity [Romosozumab] Shortness Of Breath and Cough       Immunization History   Immunization History   Administered Date(s) Administered    COVID-19 12+ (2023-24) (MODERNA) 12/18/2023    COVID-19 MONOVALENT 12+ (Pfizer) 12/08/2021    COVID-19 Monovalent 18+ (Moderna) 02/04/2021, 03/04/2021    Influenza (High Dose) 3 valent vaccine 10/10/2017       Social History   I have reviewed this patient's social history and updated it with pertinent information if needed. Amita Yates  reports that she has never smoked. She does not have any smokeless tobacco history on file. She reports current alcohol use. She reports that she does not use drugs.    Family History   I have reviewed this patient's family history and updated it with pertinent information if needed.   No family history on file.    Review of Systems   The 10 point Review of Systems is negative    Physical Exam   Temp: 98  F (36.7  C) Temp src: Axillary BP: 106/56 Pulse: 90   Resp: 26 SpO2: 94 % O2 Device: None (Room air) Oxygen Delivery: 1 LPM  Vital Signs with Ranges  Temp:  [97.2  F (36.2  C)-98  F (36.7  C)] 98  F (36.7  C)  Pulse:  [71-93] 90  Resp:  [15-29] 26  BP: ()/(48-83) 106/56  SpO2:  [92 %-96 %] 94 %  155 lbs 3.26 oz  Body mass index is 26.64 kg/m .    GENERAL APPEARANCE:  awake  EYES: Eyes grossly normal to inspection  NECK: no adenopathy  RESP: lungs left lung chest tube sounds  CV: regular rates and rhythm  LYMPHATICS: normal ant/post cervical and supraclavicular nodes  ABDOMEN: soft, nontender  MS: extremities normal  SKIN: no suspicious lesions or rashes        Data   All laboratory and imaging data in the past 24 hours reviewed  No results for input(s): \"CULT\" in the last 168 hours.  No lab results found.    Invalid input(s): \"UC\"       All cultures:  Recent Labs   Lab 05/24/24  1638 05/24/24  1402   CULTURE 4+ " "Streptococcus intermedius*  No anaerobic organisms isolated after 2 days Positive on the 1st day of incubation*  Aerococcus viridans*  No growth after 2 days      Blood culture:  Results for orders placed or performed during the hospital encounter of 05/24/24   Blood Culture Peripheral Blood    Specimen: Peripheral Blood   Result Value Ref Range    Culture No growth after 2 days    Blood Culture Peripheral Blood    Specimen: Peripheral Blood   Result Value Ref Range    Culture Positive on the 1st day of incubation (A)     Culture Aerococcus viridans (AA)        Susceptibility    Aerococcus viridans - ANDREW*     Penicillin 0.25 Intermediate ug/mL     Clindamycin <=0.06 No interpretation available ug/mL     Cefotaxime 1 Susceptible ug/mL     Ceftriaxone 1 Susceptible ug/mL     Vancomycin 0.25 Susceptible ug/mL     * Antibiotics listed as \"No Interpretation\" have no regulatory guidelines for susceptibility/resistance available.      Urine culture:  No results found for this or any previous visit.          "

## 2024-05-27 NOTE — PROGRESS NOTES
[facility]    Medicine Progress Note - Hospitalist Service    Date of Admission:  5/24/2024    Assessment & Plan     Amita Yates is a 79 year old female with pmh of hypertension, depression, thyroid disease admitted on 5/24/2024 with respiratory failure from a large loculated pleural effusion/empyema.  She has a chest tube in place     Large left loculated pleural effusion/empyema, with rightward mediastinal shift  Acute hypoxic respiratory failure due to above   Sepsis due to above and possibly GPC bacteremia  Patient with dyspnea.  Noted leukocytosis, tachycardia and CXR of complete opacification of the left hemithorax  CT: Large loculated left pleural effusion with significant mass effect, and rightward mediastinal shift. Mild smooth right pleural thickening.  Chest tube placed in the ED, thoracic surgery consulted  Blood culture is growing 1/2 bottles GPC with negative Verigene.  Fluid culture is growing strept intermedius.      Continue chest tube per CT surgeon   Continuing with lytics through 5/27, serial chest x-ray, improved left effusion.  Continue supplemental oxygen, encourage I-S and wean as able  Started on IV Zosyn, changed to Rocephin per ID, appreciate assistance.  Follow up final cultures      AG/Lactic acidosis likely from sepsis  Resolved with IV fluid and antibiotic as above    FEN  Hypernatremia - resolved   Hypokalemia - corrected   Change intravenous fluid from D5W to NS  Monitor electrolytes      Hypertension   BP controlled off antihypertensive     Gastroesophageal reflux disease   Continue proton pump inhibitor      Hypothyroidism   Continue levothyroxine     Depression  Apparently she has not been taking her medications.  Continue to hold prior to admission bupropion, citalopram, methylphenidate   Will consult psychiatry service to see her 5/27     Osteoporosis  Hold prior to admission Forteo and Evenity         Diet: Combination Diet Regular Diet Adult   "  DVT Prophylaxis: Pneumatic Compression Devices  Mehta Catheter: Not present  Lines: None     Cardiac Monitoring: ACTIVE order. Indication: IMC  Code Status: Full Code      Clinically Significant Risk Factors              # Hypoalbuminemia: Lowest albumin = 2.4 g/dL at 5/25/2024  6:02 AM, will monitor as appropriate            # Overweight: Estimated body mass index is 26.64 kg/m  as calculated from the following:    Height as of this encounter: 1.626 m (5' 4\").    Weight as of this encounter: 70.4 kg (155 lb 3.3 oz)., PRESENT ON ADMISSION            Disposition Plan     Medically Ready for Discharge: Anticipated in 2-4 Days anticipate several days pending management of chest tube and once removed.             Ger Marcus MD  Hospitalist Service  [facility]  Securely message with [employer] (more info)  Text page via [facility] Paging/Directory   ______________________________________________________________________    Interval History   Chart reviewed, discussed with nursing staff and evaluated patient this morning.  Denies chest pain, shortness of breath.  Reports dysuria.  Remains afebrile.  On 1-2 LPM NC O2.  Follow-up chest x-ray shows well-drained left pleural space, appears stable.    Physical Exam   Vital Signs: Temp: 98  F (36.7  C) Temp src: Axillary BP: 126/83 Pulse: 93   Resp: 21 SpO2: 95 % O2 Device: Nasal cannula Oxygen Delivery: 2 LPM  Weight: 155 lbs 3.26 oz    General: AAOx3, appears comfortable.  HEENT: PERRLA EOMI. Mucosa moist.   Lungs: Bilateral equal air entry. Clear to auscultation anteriorly, diminished and coarse breath sounds lung bases especially on left, chest tube in place on suction, normal work of breathing.   CVS: S1S2 regular, no tachycardia or murmur.   Abdomen: Soft, NT, ND. BS heard.  MSK: No edema or deformities.  Neuro: AAOX3. CN 2-12 normal. Strength symmetrical.  Skin: No rash.       Medical Decision Making       47 MINUTES SPENT BY ME on the date of " service doing chart review, history, exam, documentation & further activities per the note.      Data     I have personally reviewed the following data over the past 24 hrs:    10.1  \   12.0   / 469 (H)     142 108 (H) 9.6 /  130 (H)   3.5 26 0.65 \     Procal: N/A CRP: N/A Lactic Acid: 1.0         Imaging results reviewed over the past 24 hrs:   Recent Results (from the past 24 hour(s))   XR Chest Port 1 View    Narrative    EXAM: XR CHEST PORT 1 VIEW  LOCATION: [facility]  DATE: 5/27/2024    INDICATION: fu chest tube and empyema  COMPARISON: 5/26/2024      Impression    IMPRESSION: Left basilar smallbore thoracostomy tube. Opacities left lung base likely relating to atelectasis and a combination of minimal residual left basilar pleural fluid, unchanged. Left upper lung remains clear. Mild atelectasis medial right lung   base. Mild cardiac enlargement. Normal pulmonary vascularity. Aortic calcification. Severe degenerative changes both glenohumeral joints.

## 2024-05-27 NOTE — PROGRESS NOTES
THORACIC SURGERY     Doing ok  Still significant CT output with lytics  Output still somewhat cloudy    CXR left pleural space well drained    Last dose of lytics this evening    CT suction    Antibiotics ad per ID    IVET DUPONT MD [facility] ONCOLOGY THORACIC SURGERY  CELL:  [phone]  OFFICE: [phone]

## 2024-05-27 NOTE — PLAN OF CARE
Orientation: A/Ox4     Vitals/Tele: VSS - LSD RA, SR, Normo/Hypot, Afebrile     IV Access/drains: L CT site, 1 PIV     Diet: Regular - fair appetite     Mobility: Ax1 GB/W, q2hr turns     GI/: Incontinent of B/B, BS audible     Wound/Skin: Wound to sacrum, scattered bruising, edema/redness to perinium     Consults: Thoracic surg, ID     Discharge Plan: Pending        See Flow sheets for assessment               Goal Outcome Evaluation:

## 2024-05-27 NOTE — PLAN OF CARE
Goal Outcome Evaluation:      Plan of Care Reviewed With: patient    Overall Patient Progress: no changeOverall Patient Progress: no change    Outcome Evaluation: Pt remained safe during this shift.    [id number]  Orientations: A/Ox4   Vitals/Pain: Soft BP other VSS and on 2L NC. Denied pain.  Tele: SR  Lines/Drains: R AC PIV infusing NS at 75ml/hr. CT w/ -20 suction. [facility] changed on 5/26 at 2325. CT site CDI.  Skin/Wounds: Blanchable redness at coccyx and elbows. Scattered bruises and pale skin.  GI/: Incontinent of urine/ placed purewick. Loose BMX1 this morning.   Labs: Abnormal/Trends, Electrolyte Replacement- On K+ protocol. Lactic q6h check.  Ambulation/Assist: Lift, reposition 2h.  Sleep Quality: Slept well overnight.  Plan: Discharge pending

## 2024-05-27 NOTE — PROVIDER NOTIFICATION
MD Notification    Notified Person: MD    Notified Person Name: Dr. Chau    Notification Date/Time: 5/26 1301    Notification Interaction: vocera message     Purpose of Notification: Lactic of 2.5    Orders Received: Bolus of 500ml NS    Comments:

## 2024-05-28 ENCOUNTER — APPOINTMENT (OUTPATIENT)
Dept: GENERAL RADIOLOGY | Facility: CLINIC | Age: 79
DRG: 871 | End: 2024-05-28
Attending: THORACIC SURGERY (CARDIOTHORACIC VASCULAR SURGERY)
Payer: COMMERCIAL

## 2024-05-28 ENCOUNTER — APPOINTMENT (OUTPATIENT)
Dept: PHYSICAL THERAPY | Facility: CLINIC | Age: 79
DRG: 871 | End: 2024-05-28
Attending: HOSPITALIST
Payer: COMMERCIAL

## 2024-05-28 LAB
LACTATE SERPL-SCNC: 0.8 MMOL/L (ref 0.7–2)
LACTATE SERPL-SCNC: 0.8 MMOL/L (ref 0.7–2)
LACTATE SERPL-SCNC: 1.2 MMOL/L (ref 0.7–2)
LACTATE SERPL-SCNC: 2.1 MMOL/L (ref 0.7–2)
POTASSIUM SERPL-SCNC: 3.3 MMOL/L (ref 3.4–5.3)
POTASSIUM SERPL-SCNC: 3.8 MMOL/L (ref 3.4–5.3)

## 2024-05-28 PROCEDURE — 120N000013 HC R&B IMCU

## 2024-05-28 PROCEDURE — 36415 COLL VENOUS BLD VENIPUNCTURE: CPT | Performed by: HOSPITALIST

## 2024-05-28 PROCEDURE — 97530 THERAPEUTIC ACTIVITIES: CPT | Mod: GP

## 2024-05-28 PROCEDURE — 84132 ASSAY OF SERUM POTASSIUM: CPT | Performed by: THORACIC SURGERY (CARDIOTHORACIC VASCULAR SURGERY)

## 2024-05-28 PROCEDURE — 99232 SBSQ HOSP IP/OBS MODERATE 35: CPT | Performed by: HOSPITALIST

## 2024-05-28 PROCEDURE — 36415 COLL VENOUS BLD VENIPUNCTURE: CPT | Performed by: THORACIC SURGERY (CARDIOTHORACIC VASCULAR SURGERY)

## 2024-05-28 PROCEDURE — 84132 ASSAY OF SERUM POTASSIUM: CPT | Performed by: HOSPITALIST

## 2024-05-28 PROCEDURE — 71045 X-RAY EXAM CHEST 1 VIEW: CPT

## 2024-05-28 PROCEDURE — 250N000013 HC RX MED GY IP 250 OP 250 PS 637: Performed by: THORACIC SURGERY (CARDIOTHORACIC VASCULAR SURGERY)

## 2024-05-28 PROCEDURE — 83605 ASSAY OF LACTIC ACID: CPT | Performed by: HOSPITALIST

## 2024-05-28 PROCEDURE — 99222 1ST HOSP IP/OBS MODERATE 55: CPT

## 2024-05-28 PROCEDURE — 97161 PT EVAL LOW COMPLEX 20 MIN: CPT | Mod: GP

## 2024-05-28 PROCEDURE — 250N000013 HC RX MED GY IP 250 OP 250 PS 637: Performed by: HOSPITALIST

## 2024-05-28 PROCEDURE — 99232 SBSQ HOSP IP/OBS MODERATE 35: CPT | Performed by: INTERNAL MEDICINE

## 2024-05-28 PROCEDURE — 97116 GAIT TRAINING THERAPY: CPT | Mod: GP

## 2024-05-28 PROCEDURE — 250N000011 HC RX IP 250 OP 636: Performed by: INTERNAL MEDICINE

## 2024-05-28 RX ORDER — BUPROPION HYDROCHLORIDE 150 MG/1
150 TABLET ORAL EVERY MORNING
Status: DISCONTINUED | OUTPATIENT
Start: 2024-05-29 | End: 2024-05-28

## 2024-05-28 RX ORDER — BUPROPION HYDROCHLORIDE 150 MG/1
150 TABLET ORAL DAILY
Status: DISCONTINUED | OUTPATIENT
Start: 2024-05-28 | End: 2024-05-31 | Stop reason: HOSPADM

## 2024-05-28 RX ORDER — PANTOPRAZOLE SODIUM 40 MG/1
40 TABLET, DELAYED RELEASE ORAL
Status: DISCONTINUED | OUTPATIENT
Start: 2024-05-28 | End: 2024-05-31 | Stop reason: HOSPADM

## 2024-05-28 RX ORDER — POTASSIUM CHLORIDE 1500 MG/1
40 TABLET, EXTENDED RELEASE ORAL ONCE
Status: COMPLETED | OUTPATIENT
Start: 2024-05-28 | End: 2024-05-28

## 2024-05-28 RX ORDER — METHYLPHENIDATE HYDROCHLORIDE 5 MG/1
5 TABLET ORAL 4 TIMES DAILY
Status: DISCONTINUED | OUTPATIENT
Start: 2024-05-28 | End: 2024-05-31 | Stop reason: HOSPADM

## 2024-05-28 RX ADMIN — PANTOPRAZOLE SODIUM 40 MG: 40 TABLET, DELAYED RELEASE ORAL at 17:12

## 2024-05-28 RX ADMIN — POTASSIUM CHLORIDE 40 MEQ: 1500 TABLET, EXTENDED RELEASE ORAL at 06:49

## 2024-05-28 RX ADMIN — METHYLPHENIDATE HYDROCHLORIDE 5 MG: 5 TABLET ORAL at 17:12

## 2024-05-28 RX ADMIN — PANTOPRAZOLE SODIUM 40 MG: 40 TABLET, DELAYED RELEASE ORAL at 06:22

## 2024-05-28 RX ADMIN — METHYLPHENIDATE HYDROCHLORIDE 5 MG: 5 TABLET ORAL at 21:37

## 2024-05-28 RX ADMIN — LEVOTHYROXINE SODIUM 100 MCG: 100 TABLET ORAL at 09:29

## 2024-05-28 RX ADMIN — BUPROPION HYDROCHLORIDE 150 MG: 150 TABLET, EXTENDED RELEASE ORAL at 17:12

## 2024-05-28 RX ADMIN — CEFTRIAXONE SODIUM 2 G: 2 INJECTION, POWDER, FOR SOLUTION INTRAMUSCULAR; INTRAVENOUS at 09:29

## 2024-05-28 ASSESSMENT — ACTIVITIES OF DAILY LIVING (ADL)
ADLS_ACUITY_SCORE: 59
ADLS_ACUITY_SCORE: 54
ADLS_ACUITY_SCORE: 55
ADLS_ACUITY_SCORE: 54
ADLS_ACUITY_SCORE: 59
ADLS_ACUITY_SCORE: 59
ADLS_ACUITY_SCORE: 54
ADLS_ACUITY_SCORE: 55
ADLS_ACUITY_SCORE: 54
ADLS_ACUITY_SCORE: 54
ADLS_ACUITY_SCORE: 55
ADLS_ACUITY_SCORE: 54
ADLS_ACUITY_SCORE: 55
ADLS_ACUITY_SCORE: 59
ADLS_ACUITY_SCORE: 54
ADLS_ACUITY_SCORE: 54
ADLS_ACUITY_SCORE: 59
ADLS_ACUITY_SCORE: 59
ADLS_ACUITY_SCORE: 54
ADLS_ACUITY_SCORE: 55
ADLS_ACUITY_SCORE: 54
ADLS_ACUITY_SCORE: 59
ADLS_ACUITY_SCORE: 55

## 2024-05-28 NOTE — PLAN OF CARE
Goal Outcome Evaluation:  Neuro: A&Ox4, flat affect. Very [place], uses phone microphone or white board to communicate  Vitals/Pain: VSS on 1-2L NC. Denies pain.  Tele: SR with PACs and sinus pause  Diet: regular diet  Lungs: dim, Pulmonary toilet encouraged.  Lines/Drains: L CT to -20 suction with yellow output  Skin/Wounds: L CT site, redness to sacrum mepilex in place  GI/: 2 incont Bms today, passing gas.Incontinent of urine.  Labs/Abnormal/Trends/Electrolyte Replacement: potassium replaced this AM, recheck WNL. Lactic 2.1, q6h next at 0000  Ambulation/Assist: Ax1 gb/walker, pt up in chair for 4 hours.   Plan: continue CT to suction.

## 2024-05-28 NOTE — PLAN OF CARE
Goal Outcome Evaluation:      Plan of Care Reviewed With: patient    Overall Patient Progress: no changeOverall Patient Progress: no change    Outcome Evaluation: Pt remained safe during this shift.    [id number]  Orientations: A/o x4  Vitals/Pain: VSS and on 2l NC. Denied pain.  Tele: SR  Lines/Drains: L FA PIV SL. CT w/ -20 suction. Last dose of lytic  was given during this shift.  Skin/Wounds: Blanchable redness at coccyx, R knee abrasion, dry and pale skin. CT site.  GI/: Incontinent of B/B. LBM this morning.  Labs: Abnormal/Trends, Electrolyte Replacement- Lactic acid-0.8 K-3.3, replaced this morning recheck ordered.   Ambulation/Assist: 1PA / GB/W  Sleep Quality: Slept well overnight  Plan: Thoracic surgery and ID following.

## 2024-05-28 NOTE — CONSULTS
"      Initial Psychiatric Consult   Consult date: May 28, 2024         Reason for Consult, requesting source:    Off home meds  Requesting source:     Labs and imaging reviewed. Discussed with nursing.        HPI:   Amita Yates is a 79 year old female with a history of hypertension, depression, and hypothyroidism, who was admitted on 5/24/24 with respiratory failure due to large left pleural effusion/empyema. She was previously on buproprion, citalopram, and methylphenidate but has been off of them for about a month, prompting psychiatry consult.    I met with \"Aracely\" in her room, she was calm and cooperative with assessment. She is very hard of hearing, uses an ashley on her cell phone to dictate speech to text so she can read what is said to her. She tells me that she has a \"very long history\" of depression, and had been on bupropion, citalopram, and methylphenidate for many years. Unable to quantify how long exactly. She had a recent doctor's visit about a month ago for osteoporosis, and was confused about her medications after this visit which is why she stopped taking them. She states that when she was taking the medications, she was not sure if they were particularly helpful, but now that she is off of them she has noticed a dip in her mood, but denies feeling severely depressed. Sleep is good, frequently interrupted in the hospital but returns to sleep easily. Appetite is good, eating well. Energy is low. She does note that methylphenidate was tremendously helpful for fatigue and gave her energy to attend to basic activities. She denies SI/HI/AVH.        Past Psychiatric History:   Reports a long history of depression, managed by her PCP, PTA medications include bupropion, citalopram, and methylphenidate for fatigue.        Substance Use and History:   Denies.        Past Medical History:   PAST MEDICAL HISTORY:   Past Medical History:   Diagnosis Date    Cataract     Depression     Fracture     forearm "    Heart murmur     Hypertension     borderline       PAST SURGICAL HISTORY:   Past Surgical History:   Procedure Laterality Date    HYSTERECTOMY RADICAL      INCISION LIMBAL RELAXING, KERATOTOMY ARCUATE  10/15/2012    Procedure: INCISION LIMBAL RELAXING, KERATOTOMY ARCUATE;;  Surgeon: Louie Moulton MD;  Location: [facility]    PHACOEMULSIFICATION CLEAR CORNEA WITH STANDARD INTRAOCULAR LENS IMPLANT  10/1/2012    Procedure: PHACOEMULSIFICATION CLEAR CORNEA WITH STANDARD INTRAOCULAR LENS IMPLANT;  LEFT PHACOEMULSIFICATION CLEAR CORNEA WITH STANDARD INTRAOCULAR LENS IMPLANT;  Surgeon: Louie Moulton MD;  Location: [facility]    PHACOEMULSIFICATION CLEAR CORNEA WITH STANDARD INTRAOCULAR LENS IMPLANT  10/15/2012    Procedure: PHACOEMULSIFICATION CLEAR CORNEA WITH STANDARD INTRAOCULAR LENS IMPLANT;  RIGHT PHACOEMULSIFICATION CLEAR CORNEA WITH STANDARD INTRAOCULAR LENS IMPLANT AND LIMBAL RELAXING INCISION;  Surgeon: Louie Moulton MD;  Location: [facility]             Family History:   FAMILY HISTORY: No family history on file.        Social History:   SOCIAL HISTORY:   Social History     Tobacco Use    Smoking status: Never    Smokeless tobacco: Not on file   Substance Use Topics    Alcohol use: Yes            Physical ROS:   The 10 point Review of Systems is negative other than noted in the HPI or here.           Medications:     Current Facility-Administered Medications   Medication Dose Route Frequency Provider Last Rate Last Admin    [Held by provider] buPROPion (WELLBUTRIN XL) 24 hr tablet 300 mg  300 mg Oral QAM Carlin Chau MD   300 mg at 05/25/24 1234    cefTRIAXone (ROCEPHIN) 2 g vial to attach to  ml bag for ADULTS or NS 50 ml bag for PEDS  2 g Intravenous Q24H Blair Jeffries MD   2 g at 05/28/24 0929    [Held by provider] citalopram (celeXA) tablet 40 mg  40 mg Oral Daily Carlin Chau MD   40 mg at 05/25/24 1234    levothyroxine (SYNTHROID/LEVOTHROID) tablet 100 mcg  100 mcg Oral Daily  Surendra Senior, DO   100 mcg at 05/28/24 0929    [Held by provider] methylphenidate (RITALIN) tablet 10 mg  10 mg Oral 4x Daily Carlin Chau MD   10 mg at 05/25/24 1234    pantoprazole (PROTONIX) EC tablet 40 mg  40 mg Oral BID AC Ger Marcus MD        sodium chloride (PF) 0.9% PF flush 3 mL  3 mL Intracatheter Q8H Ger Marcus MD   3 mL at 05/28/24 0929              Allergies:     Allergies   Allergen Reactions    Evenity [Romosozumab] Shortness Of Breath and Cough          Labs:     Recent Results (from the past 48 hour(s))   Lactic acid whole blood    Collection Time: 05/26/24  8:39 PM   Result Value Ref Range    Lactic Acid 1.7 0.7 - 2.0 mmol/L   Lactic acid whole blood    Collection Time: 05/27/24 12:07 AM   Result Value Ref Range    Lactic Acid 1.0 0.7 - 2.0 mmol/L   Lactic acid whole blood    Collection Time: 05/27/24  5:45 AM   Result Value Ref Range    Lactic Acid 1.0 0.7 - 2.0 mmol/L   Basic metabolic panel    Collection Time: 05/27/24  5:45 AM   Result Value Ref Range    Sodium 142 135 - 145 mmol/L    Potassium 3.5 3.4 - 5.3 mmol/L    Chloride 108 (H) 98 - 107 mmol/L    Carbon Dioxide (CO2) 26 22 - 29 mmol/L    Anion Gap 8 7 - 15 mmol/L    Urea Nitrogen 9.6 8.0 - 23.0 mg/dL    Creatinine 0.65 0.51 - 0.95 mg/dL    GFR Estimate 90 >60 mL/min/1.73m2    Calcium 7.8 (L) 8.8 - 10.2 mg/dL    Glucose 130 (H) 70 - 99 mg/dL   CBC with platelets and differential    Collection Time: 05/27/24  5:45 AM   Result Value Ref Range    WBC Count 10.1 4.0 - 11.0 10e3/uL    RBC Count 4.30 3.80 - 5.20 10e6/uL    Hemoglobin 12.0 11.7 - 15.7 g/dL    Hematocrit 38.2 35.0 - 47.0 %    MCV 89 78 - 100 fL    MCH 27.9 26.5 - 33.0 pg    MCHC 31.4 (L) 31.5 - 36.5 g/dL    RDW 15.6 (H) 10.0 - 15.0 %    Platelet Count 469 (H) 150 - 450 10e3/uL    % Neutrophils      % Lymphocytes      % Monocytes      % Eosinophils      % Basophils      % Immature Granulocytes      NRBCs per 100 WBC 0 <1 /100    Absolute  Neutrophils      Absolute Lymphocytes      Absolute Monocytes      Absolute Eosinophils      Absolute Basophils      Absolute Immature Granulocytes      Absolute NRBCs 0.0 10e3/uL   Manual Differential    Collection Time: 05/27/24  5:45 AM   Result Value Ref Range    % Neutrophils 75 %    % Lymphocytes 12 %    % Monocytes 5 %    % Eosinophils 0 %    % Basophils 4 %    % Metamyelocytes 1 %    % Myelocytes 3 %    Absolute Neutrophils 7.6 1.6 - 8.3 10e3/uL    Absolute Lymphocytes 1.2 0.8 - 5.3 10e3/uL    Absolute Monocytes 0.5 0.0 - 1.3 10e3/uL    Absolute Eosinophils 0.0 0.0 - 0.7 10e3/uL    Absolute Basophils 0.4 (H) 0.0 - 0.2 10e3/uL    Absolute Metamyelocytes 0.1 (H) <=0.0 10e3/uL    Absolute Myelocytes 0.3 (H) <=0.0 10e3/uL    RBC Morphology Confirmed RBC Indices     Platelet Assessment  Automated Count Confirmed. Platelet morphology is normal.     Automated Count Confirmed. Platelet morphology is normal.    Smudge Cells Present (A) None Seen   Lactic acid whole blood    Collection Time: 05/27/24 12:04 PM   Result Value Ref Range    Lactic Acid 1.7 0.7 - 2.0 mmol/L   Lactic acid whole blood    Collection Time: 05/27/24  6:25 PM   Result Value Ref Range    Lactic Acid 1.1 0.7 - 2.0 mmol/L   Lactic acid whole blood    Collection Time: 05/28/24 12:56 AM   Result Value Ref Range    Lactic Acid 0.8 0.7 - 2.0 mmol/L   Lactic acid whole blood    Collection Time: 05/28/24  5:54 AM   Result Value Ref Range    Lactic Acid 0.8 0.7 - 2.0 mmol/L   Potassium    Collection Time: 05/28/24  5:54 AM   Result Value Ref Range    Potassium 3.3 (L) 3.4 - 5.3 mmol/L   Lactic acid whole blood    Collection Time: 05/28/24 11:52 AM   Result Value Ref Range    Lactic Acid 1.2 0.7 - 2.0 mmol/L   Potassium    Collection Time: 05/28/24 11:52 AM   Result Value Ref Range    Potassium 3.8 3.4 - 5.3 mmol/L          Physical and Psychiatric Examination:     /62 (BP Location: Left arm, Patient Position: Semi-Amaro's)   Pulse 100   Temp 97.2  " F (36.2  C) (Axillary)   Resp 23   Ht 1.626 m (5' 4\")   Wt 70.4 kg (155 lb 3.3 oz)   SpO2 97%   BMI 26.64 kg/m    Weight is 155 lbs 3.26 oz  Body mass index is 26.64 kg/m .    Physical Exam:  I have reviewed the physical exam as documented by by the medical team and agree with findings and assessment and have no additional findings to add at this time.    Mental Status Exam:    Appearance: awake, alert and adequately groomed  Attitude:  cooperative  Eye Contact:  good  Mood:   \"not so good\"  Affect:  mood congruent and intensity is blunted  Speech:  clear, coherent  Psychomotor Behavior:  no evidence of tardive dyskinesia, dystonia, or tics  Thought Process:  logical, linear, and goal oriented  Associations:  no loose associations  Thought Content:  no evidence of suicidal ideation or homicidal ideation and no evidence of psychotic thought  Insight:  good  Judgement:  intact  Oriented to:  time, person, and place  Attention Span and Concentration:  fair  Recent and Remote Memory:  fair               DSM-5 Diagnosis:   Major depressive disorder, recurrent, moderate          Assessment:   Amita Yates is a 79 year old female with a history of depression who has been off of her PTA psychiatric medications for about a month due to some confusion after an outpatient appointment. She has noticed a dip in her mood since stopping medications, and would like to resume. She has particularly noticed a significant change in energy levels without methylphenidate- this greatly helped her fatigue. Recommend resuming methylphenidate at lower dose of 5mg four times daily (PTA dose of 10mg four times daily).     In reviewing her most recent EKG on 5/24/24, QTc is prolonged at 492ms. Citalopram can prolong QTc and is not recommended to be resumed at this time. She is not sure if citalopram was particularly helpful or not, had been taking combination of medications for so long that she does not recall. She is agreeable to " discontinue citalopram and instead only start bupropion again. Recommend resuming bupropion at lower dose of 150mg daily.           Summary of Recommendations:   Recommend resuming bupropion XL 150mg daily. Can increase to PTA dose of 300mg daily after 3-4 days if tolerated.  Recommend resuming methylphenidate 5mg four times daily for chronic fatigue. Can increase to PTA dose of 10mg four times daily after 3-4 days if tolerated.  Monitor for BP, HR changes with bupropion and methylphenidate.  Recommend discontinuing PTA citalopram, due to prolonged QTc and unclear benefit from this medication.       SULY Montejo CNP    Consult/Liaison Psychiatry   [facility]    Contact information available via [facility] Paging/Directory  If I am not available, then [facility] CL line ([phone]) should know who is covering our consult service.

## 2024-05-28 NOTE — PROGRESS NOTES
"Thoracic Surgery:    /56 (BP Location: Left arm, Patient Position: Semi-Amaro's, Cuff Size: Adult Regular)   Pulse 64   Temp 97.3  F (36.3  C) (Axillary)   Resp 15   Ht 1.626 m (5' 4\")   Wt 70.4 kg (155 lb 3.3 oz)   SpO2 96%   BMI 26.64 kg/m      CXR: left pleural space well drained, OK  CT: serous output, 530 ml over 24 hours    S: Sleeping soundly so did not awaken. On 1 L while sleeping  O: CT: no air leak, no bleeding  P: Cont CT suction for now    uElalia Wallis PA-C with Dr. Garo Quintanilla  [facility]  Cell [phone]    "

## 2024-05-28 NOTE — PROGRESS NOTES
[facility]  Infectious Disease Progress Note          Assessment and Plan:   Date of Admission:  5/24/2024  Date of Consult (When I saw the patient): 05/27/24        Assessment & Plan  Amita Yates is a 79 year old who was admitted on 5/24/2024.      Impression: 1 79-year-old female, acute left empyema, strep intermedius and cultures, now with chest tube and lytics with progressive clinical improvement  2 positive blood culture for Aerococcus viridans likely contaminant, conceivably part of the pleural infection if so covered by same antibiotics     REc 1 simplify to ceftriaxone while here, eventual oral options if well-drained and doing well.  This organism can be challenging with only tube drainage so I will certainly IV antibiotics while here           Interval History:     no new complaints a temp down, white count 10,000 yesterday, mild pain, fairly sleepy this morning may be slightly confused but not that ill looking              Medications:     Current Facility-Administered Medications   Medication Dose Route Frequency Provider Last Rate Last Admin    [Held by provider] buPROPion (WELLBUTRIN XL) 24 hr tablet 300 mg  300 mg Oral QAM Carlin Chau MD   300 mg at 05/25/24 1234    cefTRIAXone (ROCEPHIN) 2 g vial to attach to  ml bag for ADULTS or NS 50 ml bag for PEDS  2 g Intravenous Q24H Blair Jeffries MD   2 g at 05/27/24 0917    [Held by provider] citalopram (celeXA) tablet 40 mg  40 mg Oral Daily Carlin Chau MD   40 mg at 05/25/24 1234    levothyroxine (SYNTHROID/LEVOTHROID) tablet 100 mcg  100 mcg Oral Daily Surendra Senior, DO   100 mcg at 05/27/24 0918    [Held by provider] methylphenidate (RITALIN) tablet 10 mg  10 mg Oral 4x Daily Carlin Chau MD   10 mg at 05/25/24 1234    pantoprazole (PROTONIX) EC tablet 40 mg  40 mg Oral BID Ger Benitez MD        sodium chloride (PF) 0.9% PF flush 3 mL  3 mL Intracatheter Q8H  "Ger Marcus MD                      Physical Exam:   Blood pressure 103/56, pulse 64, temperature 97.3  F (36.3  C), temperature source Axillary, resp. rate 15, height 1.626 m (5' 4\"), weight 70.4 kg (155 lb 3.3 oz), SpO2 96%.  Wt Readings from Last 2 Encounters:   05/24/24 70.4 kg (155 lb 3.3 oz)   10/15/12 71.2 kg (157 lb)     Vital Signs with Ranges  Temp:  [97.3  F (36.3  C)-97.9  F (36.6  C)] 97.3  F (36.3  C)  Pulse:  [64-90] 64  Resp:  [15-29] 15  BP: ()/(50-82) 103/56  SpO2:  [93 %-97 %] 96 %    Constitutional: Awake, alert, cooperative, no apparent distress   mildly sleepy may be slight confusion     Lungs: CT sounds auscultation bilaterally, no crackles or wheezing   Cardiovascular: Regular rate and rhythm, normal S1 and S2, and no murmur noted   Abdomen: Normal bowel sounds, soft, non-distended, non-tender   Skin: No rashes, no cyanosis, no edema   Other:           Data:   All microbiology laboratory data reviewed.  Recent Labs   Lab Test 05/27/24 0545 05/26/24  0615 05/25/24  0602   WBC 10.1 9.7 12.5*   HGB 12.0 10.7* 12.4   HCT 38.2 34.4* 39.4   MCV 89 90 90   * 501* 576*     Recent Labs   Lab Test 05/27/24  0545 05/26/24  0615 05/25/24  0602   CR 0.65 0.69 0.68     No lab results found.  No lab results found.    Invalid input(s): \"UC\"     "

## 2024-05-28 NOTE — PROGRESS NOTES
05/28/24 1800   Appointment Info   Signing Clinician's Name / Credentials (PT) Jameson Figueroa DPT   Living Environment   People in Home alone   Current Living Arrangements house   Home Accessibility stairs to enter home;stairs within home   Number of Stairs, Main Entrance 2   Stair Railings, Main Entrance railings safe and in good condition   Number of Stairs, Within Home, Primary greater than 10 stairs   Stair Railings, Within Home, Primary railings safe and in good condition   Living Environment Comments Pt lives alone in a single level home, has ~3 BRYANT and once inside all needs met on first level except full flight of steps to laundry in the basement   Self-Care   Usual Activity Tolerance good   Current Activity Tolerance fair   Equipment Currently Used at Home none   Fall history within last six months no   Activity/Exercise/Self-Care Comment At baseline pt reports being IND at home with all mobility and ADL's   General Information   Onset of Illness/Injury or Date of Surgery 05/24/24   Referring Physician Ger Marcus MD   Patient/Family Therapy Goals Statement (PT) wants to return home but open to TCU   Pertinent History of Current Problem (include personal factors and/or comorbidities that impact the POC) Amita Yates is a 79 year old female with pmh of hypertension, depression, thyroid disease admitted on 5/24/2024 with respiratory failure from a large loculated pleural effusion/empyema.  She has a chest tube in place   Existing Precautions/Restrictions fall   General Observations pt supine in bed upon arrival, chest tube in place, heart monitor   Cognition   Affect/Mental Status (Cognition) WNL   Orientation Status (Cognition) oriented x 4   Follows Commands (Cognition) WNL   Cognitive Status Comments very [place], uses phone ashley for voice>text so pt can read in real time what provider is saying   Pain Assessment   Patient Currently in Pain Yes, see Vital Sign flowsheet  (chest tube)    Integumentary/Edema   Integumentary/Edema Comments pt has meplex around sacral area with some reddening   Posture    Posture Forward head position;Protracted shoulders;Kyphosis   Range of Motion (ROM)   Range of Motion ROM deficits secondary to pain   ROM Comment decreased trunk ROM and mobility secondary to pain from chest tube   Strength (Manual Muscle Testing)   Strength (Manual Muscle Testing) Able to perform R SLR;Able to perform L SLR;Deficits observed during functional mobility   Strength Comments Pt with global deficits noted during mobility, moves very slowly with high effort, appears grossly antigravity strength able to lift bilat LE's into SLR   Bed Mobility   Comment, (Bed Mobility) supine<>sit CGA   Transfers   Comment, (Transfers) sit<>stand with FWW Brook   Gait/Stairs (Locomotion)   [place] Level (Gait) minimum assist (75% patient effort)   Assistive Device (Gait) walker, front-wheeled   Distance in Feet (Gait) 8'   Pattern (Gait) step-through   Deviations/Abnormal Patterns (Gait) trudy decreased;gait speed decreased;stride length decreased;weight shifting decreased   Comment, (Gait/Stairs) unsteady when walking needing assist x 1   Balance   Balance Comments sitting balance appears good, standing with FWW no overt LOB, deficits with dynamic balance when walking with FWW   Sensory Examination   Sensory Perception patient reports no sensory changes   Clinical Impression   Criteria for Skilled Therapeutic Intervention Yes, treatment indicated   PT Diagnosis (PT) impaired gait and mobility   Influenced by the following impairments pain, generalized weakness, balance deficits, activity tolerance deficits   Functional limitations due to impairments transfers, amb, ADL's, stairs   Clinical Presentation (PT Evaluation Complexity) stable   Clinical Presentation Rationale PMH and clinical judgement   Clinical Decision Making (Complexity) low complexity   Planned Therapy Interventions (PT) balance  training;bed mobility training;gait training;home exercise program;patient/family education;ROM (range of motion);stair training;strengthening;stretching;transfer training;progressive activity/exercise   Risk & Benefits of therapy have been explained evaluation/treatment results reviewed;risks/benefits reviewed;care plan/treatment goals reviewed;current/potential barriers reviewed;participants voiced agreement with care plan;participants included;patient   PT Total Evaluation Time   PT Eval, Low Complexity Minutes (58626) 10   Physical Therapy Goals   PT Frequency 5x/week   PT Predicted Duration/Target Date for Goal Attainment 06/04/24   PT Goals Bed Mobility;Transfers;Gait;Stairs   PT: Bed Mobility Independent;Supine to/from sit   PT: Transfers Modified independent;Sit to/from stand;Assistive device   PT: Gait Modified independent;Rolling walker;50 feet   PT: Stairs Minimal assist;3 stairs;Rail on left;Rail on right   Interventions   Interventions Quick Adds Gait Training;Therapeutic Activity;Therapeutic Procedure   Therapeutic Activity   Therapeutic Activities: dynamic activities to improve functional performance Minutes (54086) 8   Symptoms Noted During/After Treatment Fatigue;Shortness of breath   Treatment Detail/Skilled Intervention Pt greeted supine in bed, educated on benefits of OOB activity while in hospital, pt agreeable to participate in PT. PT very [place] so increased time for pt to use [id number] ashley on phone during session and Increased time for line management, chest tube drain. Pt performed supine<>sit very slowly with CGA, tactile cues for sequencing, use of bedrail. Performed STS x 2 with FWW and Brook, cues for hand placement but pt having difficulty with carryover despite tactile cues. Upon standing pt unsteady, needing Brook on gait belt to steady self. After ambulating pt back into bed, left with direct handoff for provider who came in room to take blood. Pt was on 1 LPM NC upon arrival, it was  doffed for activity and pt was sating at 91% on RA after activity, all other vitals stable   Gait Training   Gait Training Minutes (76145) 8   Symptoms Noted During/After Treatment (Gait Training) fatigue   Treatment Detail/Skilled Intervention With pt standing at EOB, CGA on gait belt  in FWW, cued for pre gait, marching in place, small steps forward/backward, pt able to execute so ambulated in room ~25'. Pt moves very slowly and with increased effort, fatigues quickly and is mildly unsteady throughout walk needing CGA-Brook throughout. Tactile cues provided for closer walker placement, not walking with it far out ahead   PT Discharge Planning   PT Plan progress activity tolerance, gait with FWW, LE strengthening, repeated STS   PT Discharge Recommendation (DC Rec) Transitional Care Facility   PT Rationale for DC Rec Pt is below baseline of being IND living at home. Currently with deficits with generalized strength, balance, acitivty tolerance, and is a falls risk. Pt lives alone and would not be safe to return home without assist/supervision 24/7 for safety and pt will need to continue using walker to mobilize. Recommend TCU to address deficits before being safe to return home   PT Brief overview of current status CGA-Brook with FWW   PT Equipment Needed at Discharge walker, rolling   Total Session Time   Timed Code Treatment Minutes 16   Total Session Time (sum of timed and untimed services) 26

## 2024-05-28 NOTE — PROGRESS NOTES
[facility]    Medicine Progress Note - Hospitalist Service    Date of Admission:  5/24/2024    Assessment & Plan     Amita Yates is a 79 year old female with pmh of hypertension, depression, thyroid disease admitted on 5/24/2024 with respiratory failure from a large loculated pleural effusion/empyema.  She has a chest tube in place     Large left loculated pleural effusion/empyema, with rightward mediastinal shift  Acute hypoxic respiratory failure due to above   Sepsis due to above and possibly GPC bacteremia  Patient with dyspnea.  Noted leukocytosis, tachycardia and CXR of complete opacification of the left hemithorax  CT: Large loculated left pleural effusion with significant mass effect, and rightward mediastinal shift. Mild smooth right pleural thickening.  Chest tube placed in the ED, thoracic surgery consulted  Blood culture is growing 1/2 bottles GPC with negative Verigene.  Fluid culture is growing strept intermedius.      Continue chest tube per CT surgeon   Continued with lytics through 5/27, serial chest x-ray shows improved left effusion although increased bibasilar parenchymal opacity 5/28. Moderate amount of left pleural fluid persists, likely unchanged. Chest tube management per thoracic surgery  Continue supplemental oxygen, encourage I-S and wean as able  Started on IV Zosyn, pleural fluid culture grew strep intermedius, abx changed to Rocephin per ID, appreciate assistance. Plan is IV while in hospital with eventual PO  Blood cultures grew aerococcus, thought to be contaminant.     AG/Lactic acidosis likely from sepsis  Resolved with IV fluid and antibiotic as above    FEN  Hypernatremia - resolved   Hypokalemia - corrected   Change intravenous fluid from D5W to NS  Monitor electrolytes      Hypertension   BP controlled off antihypertensive     Gastroesophageal reflux disease   Continue proton pump inhibitor      Hypothyroidism   Continue levothyroxine    "  Depression  Apparently she has not been taking her medications.  Continue to hold prior to admission bupropion, citalopram, methylphenidate   Psychiatry consulted for recommendations on these medications     Osteoporosis  Hold prior to admission Forteo and Evenity         Diet: Combination Diet Regular Diet Adult    DVT Prophylaxis: Pneumatic Compression Devices  Mehta Catheter: Not present  Lines: None     Cardiac Monitoring: None  Code Status: Full Code      Clinically Significant Risk Factors        # Hypokalemia: Lowest K = 3.3 mmol/L in last 2 days, will replace as needed       # Hypoalbuminemia: Lowest albumin = 2.4 g/dL at 5/25/2024  6:02 AM, will monitor as appropriate            # Overweight: Estimated body mass index is 26.64 kg/m  as calculated from the following:    Height as of this encounter: 1.626 m (5' 4\").    Weight as of this encounter: 70.4 kg (155 lb 3.3 oz)., PRESENT ON ADMISSION            Disposition Plan     Medically Ready for Discharge: Anticipated in 2-4 Days anticipate several days pending management of chest tube and once removed.             Ger Marcus MD  Hospitalist Service  [facility]  Securely message with [employer] (more info)  Text page via [employer] Paging/Directory   ______________________________________________________________________    Interval History     Chart reviewed, discussed with nursing staff and evaluated patient this morning.  Denies chest pain, shortness of breath. Remains afebrile.  Remains on 2 LPM NC O2.       Physical Exam   Vital Signs: Temp: 97.3  F (36.3  C) Temp src: Oral BP: 103/56 Pulse: 64   Resp: 15 SpO2: 96 % O2 Device: Nasal cannula Oxygen Delivery: 2 LPM  Weight: 155 lbs 3.26 oz    General: AAOx3, appears comfortable.  HEENT: PERRLA EOMI. Mucosa moist.   Lungs: Bilateral equal air entry. Clear to auscultation anteriorly, diminished and coarse breath sounds lung bases especially on left, chest tube in place on suction, " normal work of breathing.   CVS: S1S2 regular, no tachycardia or murmur.   Abdomen: Soft, NT, ND. BS heard.  MSK: No edema or deformities.  Neuro: AAOX3. CN 2-12 normal. Strength symmetrical.  Skin: No rash.       Medical Decision Making       37 MINUTES SPENT BY ME on the date of service doing chart review, history, exam, documentation & further activities per the note.      Data     I have personally reviewed the following data over the past 24 hrs:    N/A  \   N/A   / N/A     N/A N/A N/A /  N/A   3.3 (L) N/A N/A \     Procal: N/A CRP: N/A Lactic Acid: 0.8         Imaging results reviewed over the past 24 hrs:   Recent Results (from the past 24 hour(s))   XR Chest Port 1 View    Narrative    EXAM: XR CHEST PORT 1 VIEW  LOCATION: [facility]  DATE: 5/28/2024    INDICATION: fu chest tube and empyema  COMPARISON: 5/27/2024      Impression    IMPRESSION: Left-sided chest tube remains in place. No significant pneumothorax. Heart size is stable. There is increasing bibasilar parenchymal opacity, atelectasis versus pneumonitis. Moderate amount of left pleural fluid persists, likely unchanged. No   acute bony abnormalities.

## 2024-05-29 ENCOUNTER — APPOINTMENT (OUTPATIENT)
Dept: GENERAL RADIOLOGY | Facility: CLINIC | Age: 79
DRG: 871 | End: 2024-05-29
Attending: THORACIC SURGERY (CARDIOTHORACIC VASCULAR SURGERY)
Payer: COMMERCIAL

## 2024-05-29 ENCOUNTER — APPOINTMENT (OUTPATIENT)
Dept: OCCUPATIONAL THERAPY | Facility: CLINIC | Age: 79
DRG: 871 | End: 2024-05-29
Attending: HOSPITALIST
Payer: COMMERCIAL

## 2024-05-29 LAB
ANION GAP SERPL CALCULATED.3IONS-SCNC: 6 MMOL/L (ref 7–15)
BACTERIA BLD CULT: NO GROWTH
BASOPHILS # BLD AUTO: ABNORMAL 10*3/UL
BASOPHILS # BLD MANUAL: 0 10E3/UL (ref 0–0.2)
BASOPHILS NFR BLD AUTO: ABNORMAL %
BASOPHILS NFR BLD MANUAL: 0 %
BUN SERPL-MCNC: 7 MG/DL (ref 8–23)
CALCIUM SERPL-MCNC: 7.9 MG/DL (ref 8.8–10.2)
CHLORIDE SERPL-SCNC: 107 MMOL/L (ref 98–107)
CREAT SERPL-MCNC: 0.55 MG/DL (ref 0.51–0.95)
DEPRECATED HCO3 PLAS-SCNC: 28 MMOL/L (ref 22–29)
EGFRCR SERPLBLD CKD-EPI 2021: >90 ML/MIN/1.73M2
EOSINOPHIL # BLD AUTO: ABNORMAL 10*3/UL
EOSINOPHIL # BLD MANUAL: 0.6 10E3/UL (ref 0–0.7)
EOSINOPHIL NFR BLD AUTO: ABNORMAL %
EOSINOPHIL NFR BLD MANUAL: 6 %
ERYTHROCYTE [DISTWIDTH] IN BLOOD BY AUTOMATED COUNT: 15.8 % (ref 10–15)
GLUCOSE SERPL-MCNC: 120 MG/DL (ref 70–99)
HCT VFR BLD AUTO: 37.2 % (ref 35–47)
HGB BLD-MCNC: 11.6 G/DL (ref 11.7–15.7)
IMM GRANULOCYTES # BLD: ABNORMAL 10*3/UL
IMM GRANULOCYTES NFR BLD: ABNORMAL %
LACTATE SERPL-SCNC: 0.8 MMOL/L (ref 0.7–2)
LACTATE SERPL-SCNC: 0.8 MMOL/L (ref 0.7–2)
LACTATE SERPL-SCNC: 1.4 MMOL/L (ref 0.7–2)
LYMPHOCYTES # BLD AUTO: ABNORMAL 10*3/UL
LYMPHOCYTES # BLD MANUAL: 1.4 10E3/UL (ref 0.8–5.3)
LYMPHOCYTES NFR BLD AUTO: ABNORMAL %
LYMPHOCYTES NFR BLD MANUAL: 14 %
MCH RBC QN AUTO: 28 PG (ref 26.5–33)
MCHC RBC AUTO-ENTMCNC: 31.2 G/DL (ref 31.5–36.5)
MCV RBC AUTO: 90 FL (ref 78–100)
METAMYELOCYTES # BLD MANUAL: 0.2 10E3/UL
METAMYELOCYTES NFR BLD MANUAL: 2 %
MONOCYTES # BLD AUTO: ABNORMAL 10*3/UL
MONOCYTES # BLD MANUAL: 0.6 10E3/UL (ref 0–1.3)
MONOCYTES NFR BLD AUTO: ABNORMAL %
MONOCYTES NFR BLD MANUAL: 6 %
NEUTROPHILS # BLD AUTO: ABNORMAL 10*3/UL
NEUTROPHILS # BLD MANUAL: 7.1 10E3/UL (ref 1.6–8.3)
NEUTROPHILS NFR BLD AUTO: ABNORMAL %
NEUTROPHILS NFR BLD MANUAL: 72 %
NRBC # BLD AUTO: 0 10E3/UL
NRBC BLD AUTO-RTO: 0 /100
PATH REPORT.COMMENTS IMP SPEC: NORMAL
PATH REPORT.FINAL DX SPEC: NORMAL
PATH REPORT.GROSS SPEC: NORMAL
PATH REPORT.MICROSCOPIC SPEC OTHER STN: NORMAL
PATH REPORT.RELEVANT HX SPEC: NORMAL
PLAT MORPH BLD: ABNORMAL
PLATELET # BLD AUTO: 446 10E3/UL (ref 150–450)
POTASSIUM SERPL-SCNC: 3.6 MMOL/L (ref 3.4–5.3)
RBC # BLD AUTO: 4.15 10E6/UL (ref 3.8–5.2)
RBC MORPH BLD: ABNORMAL
SODIUM SERPL-SCNC: 141 MMOL/L (ref 135–145)
WBC # BLD AUTO: 9.9 10E3/UL (ref 4–11)

## 2024-05-29 PROCEDURE — 250N000013 HC RX MED GY IP 250 OP 250 PS 637: Performed by: HOSPITALIST

## 2024-05-29 PROCEDURE — 80048 BASIC METABOLIC PNL TOTAL CA: CPT | Performed by: HOSPITALIST

## 2024-05-29 PROCEDURE — 250N000011 HC RX IP 250 OP 636: Performed by: INTERNAL MEDICINE

## 2024-05-29 PROCEDURE — 97165 OT EVAL LOW COMPLEX 30 MIN: CPT | Mod: GO

## 2024-05-29 PROCEDURE — 120N000013 HC R&B IMCU

## 2024-05-29 PROCEDURE — 88305 TISSUE EXAM BY PATHOLOGIST: CPT | Mod: 26

## 2024-05-29 PROCEDURE — 85027 COMPLETE CBC AUTOMATED: CPT | Performed by: HOSPITALIST

## 2024-05-29 PROCEDURE — 0WPB30Z REMOVAL OF DRAINAGE DEVICE FROM LEFT PLEURAL CAVITY, PERCUTANEOUS APPROACH: ICD-10-PCS | Performed by: PHYSICIAN ASSISTANT

## 2024-05-29 PROCEDURE — 97535 SELF CARE MNGMENT TRAINING: CPT | Mod: GO

## 2024-05-29 PROCEDURE — 88112 CYTOPATH CELL ENHANCE TECH: CPT | Mod: 26

## 2024-05-29 PROCEDURE — 83605 ASSAY OF LACTIC ACID: CPT | Performed by: HOSPITALIST

## 2024-05-29 PROCEDURE — 36415 COLL VENOUS BLD VENIPUNCTURE: CPT | Performed by: HOSPITALIST

## 2024-05-29 PROCEDURE — 99232 SBSQ HOSP IP/OBS MODERATE 35: CPT | Performed by: HOSPITALIST

## 2024-05-29 PROCEDURE — 85007 BL SMEAR W/DIFF WBC COUNT: CPT | Performed by: HOSPITALIST

## 2024-05-29 PROCEDURE — 250N000013 HC RX MED GY IP 250 OP 250 PS 637

## 2024-05-29 PROCEDURE — 99232 SBSQ HOSP IP/OBS MODERATE 35: CPT | Performed by: INTERNAL MEDICINE

## 2024-05-29 PROCEDURE — 71045 X-RAY EXAM CHEST 1 VIEW: CPT

## 2024-05-29 RX ORDER — LEVOTHYROXINE SODIUM 100 UG/1
100 TABLET ORAL DAILY
Status: DISCONTINUED | OUTPATIENT
Start: 2024-05-30 | End: 2024-05-31 | Stop reason: HOSPADM

## 2024-05-29 RX ADMIN — METHYLPHENIDATE HYDROCHLORIDE 5 MG: 5 TABLET ORAL at 13:37

## 2024-05-29 RX ADMIN — CEFTRIAXONE SODIUM 2 G: 2 INJECTION, POWDER, FOR SOLUTION INTRAMUSCULAR; INTRAVENOUS at 11:59

## 2024-05-29 RX ADMIN — LEVOTHYROXINE SODIUM 100 MCG: 100 TABLET ORAL at 10:18

## 2024-05-29 RX ADMIN — METHYLPHENIDATE HYDROCHLORIDE 5 MG: 5 TABLET ORAL at 09:17

## 2024-05-29 RX ADMIN — BUPROPION HYDROCHLORIDE 150 MG: 150 TABLET, EXTENDED RELEASE ORAL at 09:17

## 2024-05-29 RX ADMIN — METHYLPHENIDATE HYDROCHLORIDE 5 MG: 5 TABLET ORAL at 17:24

## 2024-05-29 RX ADMIN — METHYLPHENIDATE HYDROCHLORIDE 5 MG: 5 TABLET ORAL at 21:59

## 2024-05-29 RX ADMIN — PANTOPRAZOLE SODIUM 40 MG: 40 TABLET, DELAYED RELEASE ORAL at 06:03

## 2024-05-29 RX ADMIN — PANTOPRAZOLE SODIUM 40 MG: 40 TABLET, DELAYED RELEASE ORAL at 17:24

## 2024-05-29 ASSESSMENT — ACTIVITIES OF DAILY LIVING (ADL)
ADLS_ACUITY_SCORE: 54
ADLS_ACUITY_SCORE: 55
ADLS_ACUITY_SCORE: 54

## 2024-05-29 NOTE — PROGRESS NOTES
[facility]  Infectious Disease Progress Note          Assessment and Plan:   Date of Admission:  5/24/2024  Date of Consult (When I saw the patient): 05/27/24        Assessment & Plan  Amita Yates is a 79 year old who was admitted on 5/24/2024.      Impression: 1 79-year-old female, acute left empyema, strep intermedius and cultures, now with chest tube and lytics with progressive clinical improvement  2 positive blood culture for Aerococcus viridans likely contaminant, conceivably part of the pleural infection if so covered by same antibiotics  3 hearing loss     REc 1 continue ceftriaxone while here, eventual oral options if well-drained and doing well.  This organism can be challenging with only tube drainage but clinically doing well and looks like it is draining well, probable plan oral Augmentin roughly 2 weeks once chest tube is out           Interval History:     no new complaints a temp down, white count 10,000  mild pain, feels okay not having fever symptoms, no other focal symptoms cultures same              Medications:     Current Facility-Administered Medications   Medication Dose Route Frequency Provider Last Rate Last Admin    buPROPion (WELLBUTRIN XL) 24 hr tablet 150 mg  150 mg Oral Daily Ger Marcus MD   150 mg at 05/29/24 0917    cefTRIAXone (ROCEPHIN) 2 g vial to attach to  ml bag for ADULTS or NS 50 ml bag for PEDS  2 g Intravenous Q24H Blair Jeffries MD   2 g at 05/28/24 0929    levothyroxine (SYNTHROID/LEVOTHROID) tablet 100 mcg  100 mcg Oral Daily Surendra Senior DO   100 mcg at 05/28/24 0929    methylphenidate (RITALIN) tablet 5 mg  5 mg Oral 4x Daily Ger Marcus MD   5 mg at 05/29/24 0917    pantoprazole (PROTONIX) EC tablet 40 mg  40 mg Oral BID AC Ger Marcus MD   40 mg at 05/29/24 0603    sodium chloride (PF) 0.9% PF flush 3 mL  3 mL Intracatheter Q8H Ger Marcus MD   3 mL at 05/28/24 1712                  Physical  "Exam:   Blood pressure 116/59, pulse 76, temperature 97.4  F (36.3  C), temperature source Oral, resp. rate 18, height 1.626 m (5' 4\"), weight 68.8 kg (151 lb 10.8 oz), SpO2 96%.  Wt Readings from Last 2 Encounters:   05/29/24 68.8 kg (151 lb 10.8 oz)   10/15/12 71.2 kg (157 lb)     Vital Signs with Ranges  Temp:  [97.2  F (36.2  C)-98.1  F (36.7  C)] 97.4  F (36.3  C)  Pulse:  [] 76  Resp:  [11-27] 18  BP: (105-129)/(59-73) 116/59  SpO2:  [95 %-97 %] 96 %    Constitutional: Awake, alert, cooperative, no apparent distress   mildly sleepy may be slight confusion     Lungs: CT sounds auscultation bilaterally, no crackles or wheezing   Cardiovascular: Regular rate and rhythm, normal S1 and S2, and no murmur noted   Abdomen: Normal bowel sounds, soft, non-distended, non-tender   Skin: No rashes, no cyanosis, no edema   Other:           Data:   All microbiology laboratory data reviewed.  Recent Labs   Lab Test 05/29/24  0605 05/27/24  0545 05/26/24  0615   WBC 9.9 10.1 9.7   HGB 11.6* 12.0 10.7*   HCT 37.2 38.2 34.4*   MCV 90 89 90    469* 501*     Recent Labs   Lab Test 05/29/24  0605 05/27/24  0545 05/26/24  0615   CR 0.55 0.65 0.69     No lab results found.  No lab results found.    Invalid input(s): \"UC\"     "

## 2024-05-29 NOTE — PROGRESS NOTES
[facility]    Medicine Progress Note - Hospitalist Service    Date of Admission:  5/24/2024    Assessment & Plan     Amita Yates is a 79 year old female with pmh of hypertension, depression, thyroid disease admitted on 5/24/2024 with respiratory failure from a large loculated pleural effusion/empyema.  She has a chest tube in place     Large left loculated pleural effusion/empyema, with rightward mediastinal shift  Acute hypoxic respiratory failure due to above   Sepsis due to above and possibly GPC bacteremia  Patient with dyspnea.  Noted leukocytosis, tachycardia and CXR of complete opacification of the left hemithorax  CT: Large loculated left pleural effusion with significant mass effect, and rightward mediastinal shift. Mild smooth right pleural thickening.  Chest tube placed in the ED, thoracic surgery consulted  Blood culture is growing 1/2 bottles GPC with negative Verigene.  Fluid culture is growing strept intermedius.      Continue chest tube per CT surgeon   Continued with lytics through 5/27, serial chest x-ray shows initial maked improvement in left effusion although stable to increased bibasilar parenchymal opacity in recent XRs and still with moderate amount of left pleural fluid. ? Repeat CT, defer to thoracic surgery. Chest tube management per thoracic surgery  Weaned off supplemental oxygen, encourage I-S    Started on IV Zosyn, pleural fluid culture grew strep intermedius, abx changed to Rocephin per ID, appreciate assistance. Plan is IV while in hospital with eventual PO (augmentin for 2 weeks after CT removal)  Blood cultures grew aerococcus, thought to be contaminant.     AG/Lactic acidosis likely from sepsis  Resolved with IV fluid and antibiotic as above    FEN  Hypernatremia - resolved   Hypokalemia - corrected   Off IVF  Monitor electrolytes      Hypertension   BP controlled off antihypertensive     Gastroesophageal reflux disease   Continue proton pump  "inhibitor      Hypothyroidism   Continue levothyroxine     Depression  Apparently she has not been taking her medications.  Continue to hold prior to admission bupropion, citalopram, methylphenidate   Psychiatry consulted for recommendations on these medications and methylphenidate and bupropion resumed at lower dosages 5/28, recommending resuming home dose in the next few days.  Citalopram discontinued.     Osteoporosis  Hold prior to admission Forteo and Evenity         Diet: Combination Diet Regular Diet Adult  Room Service    DVT Prophylaxis: Pneumatic Compression Devices  Mehta Catheter: Not present  Lines: None     Cardiac Monitoring: None  Code Status: Full Code      Clinically Significant Risk Factors        # Hypokalemia: Lowest K = 3.3 mmol/L in last 2 days, will replace as needed       # Hypoalbuminemia: Lowest albumin = 2.4 g/dL at 5/25/2024  6:02 AM, will monitor as appropriate            # Overweight: Estimated body mass index is 26.04 kg/m  as calculated from the following:    Height as of this encounter: 1.626 m (5' 4\").    Weight as of this encounter: 68.8 kg (151 lb 10.8 oz).             Disposition Plan     Medically Ready for Discharge: Anticipated in 2-4 Days anticipate 2-3 days pending chest tube removal             Ger Marcus MD  Hospitalist Service  [facility]  Securely message with [employer] (more info)  Text page via [employer] Paging/Directory   ______________________________________________________________________    Interval History     Chart reviewed, discussed with nursing staff and evaluated patient this morning.  Patient did not report any acute issues including chest pain or shortness of breath.  Remains afebrile.  Supplemental O2 has been weaned off today.      Physical Exam   Vital Signs: Temp: 97.4  F (36.3  C) Temp src: Axillary BP: 120/79 Pulse: 107   Resp: (!) 31 SpO2: 94 % O2 Device: None (Room air) Oxygen Delivery: 1 LPM  Weight: 151 lbs 10.82 " oz    General: AAOx3, appears comfortable.  HEENT: PERRLA EOMI. Mucosa moist.   Lungs: Bilateral equal air entry. Clear to auscultation anteriorly, diminished and coarse breath sounds with crackles lung bases especially on left, chest tube in place on suction, normal work of breathing.   CVS: S1S2 regular, no tachycardia or murmur.   Abdomen: Soft, NT, ND. BS heard.  MSK: No edema or deformities.  Neuro: AAOX3. CN 2-12 normal. Strength symmetrical.  Skin: No rash.       Medical Decision Making       35 MINUTES SPENT BY ME on the date of service doing chart review, history, exam, documentation & further activities per the note.      Data     I have personally reviewed the following data over the past 24 hrs:    9.9  \   11.6 (L)   / 446     141 107 7.0 (L) /  120 (H)   3.6 28 0.55 \     Procal: N/A CRP: N/A Lactic Acid: 1.4         Imaging results reviewed over the past 24 hrs:   Recent Results (from the past 24 hour(s))   XR Chest Port 1 View    Narrative    EXAM: XR CHEST PORT 1 VIEW  LOCATION: [facility]  DATE: 5/29/2024    INDICATION: Follow-up chest tube and empyema.  COMPARISON: 5/28/2024.      Impression    IMPRESSION: No significant change of the left chest tube position. No significant change of left mid to lower lung opacities; differentials on radiography include atelectasis, pneumonia or pleural fluid (chest CT could be performed if necessary for   clarification). Mild right lung base atelectasis. No pneumothorax. Stable cardiomediastinal silhouette.

## 2024-05-29 NOTE — PLAN OF CARE
Orientation: A&Ox4. Able to make needs known.    Vitals/Tele: SR. VSS, SpO2>95% on 1L NC. LSC.     IV Access/drains: CT to suction, serous, patent. 1PIV    Diet: Reg diet    Mobility: Ax1 W/GB&W    GI/: Incontinent of both B&B. x1BM this shift. Purwick in place    Wound/Skin: red coccyx/sacrum, scattered skin tear, mepilex in place. CDI    Consults: PT, psych, Cardio-thoracic & ID    Discharge Plan: TBD      See Flow sheets for assessment

## 2024-05-29 NOTE — PROGRESS NOTES
05/29/24 0911   Appointment Info   Signing Clinician's Name / Credentials (OT) Belen Miguelangel, OTR/L   Living Environment   People in Home alone   Current Living Arrangements house   Home Accessibility stairs to enter home;stairs within home   Number of Stairs, Main Entrance 2   Stair Railings, Main Entrance railings safe and in good condition   Number of Stairs, Within Home, Primary greater than 10 stairs   Stair Railings, Within Home, Primary railings safe and in good condition   Living Environment Comments Pt lives alone in a single story home, 2 [address] then all needs met on main floor except laundry.   Self-Care   Usual Activity Tolerance good   Current Activity Tolerance fair   Equipment Currently Used at Home none   Fall history within last six months no   Activity/Exercise/Self-Care Comment Pt reports independence w/ ADL tasks and mobility w/out AD   Instrumental Activities of Daily Living (IADL)   IADL Comments Pt reports independence, typically drives and manages her own meds   General Information   Onset of Illness/Injury or Date of Surgery 05/24/24   Referring Physician Ger Marcus MD   Additional Occupational Profile Info/Pertinent History of Current Problem Amita Yates is a 79 year old female with pmh of hypertension, depression, thyroid disease admitted on 5/24/2024 with respiratory failure from a large loculated pleural effusion/empyema.  She has a chest tube in place   Existing Precautions/Restrictions fall  (chest tube)   Limitations/Impairments hearing   Cognitive Status Examination   Orientation Status orientation to person, place and time   Follows Commands follows one-step commands;75-90% accuracy   Cognitive Status Comments Pt oriented x4 and following commands consistently. Pt very [place], uses talk to text on her phone to understand writer. Some delay though this may be d/t [place]   Pain Assessment   Patient Currently in Pain Yes, see Vital Sign flowsheet  (near chest tube site)   Range  of Motion Comprehensive   Comment, General Range of Motion BUE WFL   Strength Comprehensive (MMT)   Comment, General Manual Muscle Testing (MMT) Assessment generalized weakness in BUE   Bed Mobility   Bed Mobility supine-sit   Supine-Sit [place] (Bed Mobility) supervision;verbal cues   Transfers   Transfers sit-stand transfer;toilet transfer   Sit-Stand Transfer   Sit-Stand [place] (Transfers) minimum assist (75% patient effort);verbal cues   Assistive Device (Sit-Stand Transfers) walker, standard   Toilet Transfer   Type (Toilet Transfer) stand-sit;sit-stand   [place] Level (Toilet Transfer) minimum assist (75% patient effort)   Assistive Device (Toilet Transfer) commode chair;walker, standard   Balance   Balance Comments CGA for balance w/ FWW short distances   Activities of Daily Living   BADL Assessment/Intervention bathing;upper body dressing;lower body dressing;grooming;toileting   Bathing Assessment/Intervention   [place] Level (Bathing) maximum assist (25% patient effort)   Comment, (Bathing) per clinical judgement   Upper Body Dressing Assessment/Training   Comment, (Upper Body Dressing) per clinical judgement   [place] Level (Upper Body Dressing) minimum assist (75% patient effort)   Lower Body Dressing Assessment/Training   [place] Level (Lower Body Dressing) maximum assist (25% patient effort)   Grooming Assessment/Training   [place] Level (Grooming) minimum assist (75% patient effort)   Comment, (Grooming) per clinical judgement   Toileting   [place] Level (Toileting) maximum assist (25% patient effort)   Clinical Impression   Criteria for Skilled Therapeutic Interventions Met (OT) Yes, treatment indicated   OT Diagnosis decreased I/ADL independence   OT Problem List-Impairments impacting ADL problems related to;activity tolerance impaired;communication;mobility;strength   Assessment of Occupational Performance 3-5 Performance Deficits   Identified Performance  Deficits decreased independence w/ dressing, bathing, functional mobility and toileting   Planned Therapy Interventions (OT) ADL retraining;IADL retraining;cognition;strengthening;transfer training;home program guidelines;risk factor education;progressive activity/exercise   Clinical Decision Making Complexity (OT) problem focused assessment/low complexity   Risk & Benefits of therapy have been explained evaluation/treatment results reviewed;patient;participants included   OT Total Evaluation Time   OT Eval, Low Complexity Minutes (47761) 5   OT Goals   Therapy Frequency (OT) 5 times/week   OT Predicted Duration/Target Date for Goal Attainment 06/06/24   OT Goals Hygiene/Grooming;Upper Body Dressing;Lower Body Dressing;Cognition;Toilet Transfer/Toileting   OT: Hygiene/Grooming modified independent;while standing   OT: Upper Body Dressing Modified independent;including set-up/clothing retrieval   OT: Lower Body Dressing Modified independent;including set-up/clothing retrieval   OT: Toilet Transfer/Toileting Modified independent;toilet transfer;cleaning and garment management;using adaptive equipment   OT: Cognitive Patient/caregiver will verbalize understanding of cognitive assessment results/recommendations as needed for safe discharge planning   Interventions   Interventions Quick Adds Self-Care/Home Management   Self-Care/Home Management   Self-Care/Home Mgmt/ADL, Compensatory, Meal Prep Minutes (69495) 24   Symptoms Noted During/After Treatment (Meal Preparation/Planning Training) fatigue   Treatment Detail/Skilled Intervention Pt in bed upon arrival, agreeable to OT. Inc time needed for room set up, line mgmt. Pt using talk to text on her phone to understand writer, very [place]. Oriented x4 and following commands, though some delay at times. Supine<>sit w/ SBA. VSS at EOB on RA, HR in low 100s. Pt reports she voided in bed. Stood w/ Min A, FWW and VC for hand placement. Upon standig, pt reports she needs to urinate  more. returned to sitting EOB while writer gathered BSC from bathroom. Pt then stood again w/ Min A and amb ~5ft w/ CGA and FWW to BSC. She voided, needing max A for ingris cares and changing brief. Pt attempting to complete independently but unable to reach while maintaining balance. Pt then amb additional 5ft back to chair w/ CGA. Inc fatigue w/ activity though VSS. Set up in chair w/ all needs met, alarm on and RN updated.   OT Discharge Planning   OT Plan standing tolerance for ADL tasks, toileting, LB dressing   OT Discharge Recommendation (DC Rec) Transitional Care Facility   OT Rationale for DC Rec Pt below baseline, was previously independent and living alone, now Ax1 for ADL tasks and mobility. Recommend TCU at d/c for continued IP therapy to progress I/ADL independence prior to return home.   OT Brief overview of current status Min A sit<>stand, CGA short distances w/ FWW, Max A LB dressing and toileting   Total Session Time   Timed Code Treatment Minutes 24   Total Session Time (sum of timed and untimed services) 29

## 2024-05-29 NOTE — PROGRESS NOTES
Interventional Radiology - Progress Note  Inpatient - [facility]  5/29/2024    IR Brief Note    Left chest tube removal requested by Erick REED due to minimal output. Chest tube removed at bedside under sterile field while air-occlusive dressing applied. No air leak noted. Patient denies CP/SOB after removal. Order CXR if any chest discomfort or acute drop in O2 sat.    Bobby Hensley PA-C  Interventional Radiology  [phone] (IR)  [phone] ([place] Office)      Today time spent on encounter today: 25 minutes

## 2024-05-29 NOTE — PLAN OF CARE
Resumed cares from [id number]  Orientations: A&O very [place].  Vitals/Pain: VSS on room air, Denies pain   Tele: NSR/ ST  Lines/Drains: CT removed this afternoon, site CDI  GI/: purewick in place  Ambulation/Assist:  Ax1 gb & walker  Plan: Psych, Thoracic & hospitalist following TCU at discharge when medically ready.

## 2024-05-29 NOTE — CONSULTS
Care Management Initial Consult    General Information  Assessment completed with: Patient, Family,    Type of CM/SW Visit: Initial Assessment    Primary Care Provider verified and updated as needed:     Readmission within the last 30 days:        Reason for Consult: discharge planning, facility placement  Advance Care Planning:            Communication Assessment  Patient's communication style: spoken language (English or Bilingual)    Hearing Difficulty or Deaf: yes   Wear Glasses or Blind: no    Cognitive  Cognitive/Neuro/Behavioral: .WDL except  Level of Consciousness: alert  Arousal Level: opens eyes spontaneously  Orientation: oriented x 4  Mood/Behavior: calm  Best Language: 0 - No aphasia  Speech: clear    Living Environment:   People in home: alone     Current living Arrangements: house      Able to return to prior arrangements: no       Family/Social Support:  Care provided by: self  Provides care for: no one  Marital Status:   Sibling(s)          Description of Support System: Involved, Supportive    Support Assessment: Adequate family and caregiver support    Current Resources:   Patient receiving home care services: No     Community Resources: None  Equipment currently used at home: none  Supplies currently used at home:      Employment/Financial:  Employment Status: retired        Financial Concerns: none   Referral to Financial Worker: No       Does the patient's insurance plan have a 3 day qualifying hospital stay waiver?  No    Lifestyle & Psychosocial Needs:  Social Determinants of Health     Food Insecurity: No Food Insecurity (5/9/2022)    Received from [employer]    Hunger Vital Sign     Worried About Running Out of Food in the Last Year: Never true     Ran Out of Food in the Last Year: Never true   Depression: Not at risk (12/18/2023)    Received from [employer]    PHQ-2     PHQ-2 Total: 0   Housing Stability: Not on  file   Tobacco Use: Low Risk  (4/17/2024)    [email or web address]    Patient History     Smoking Tobacco Use: Never     Smokeless Tobacco Use: Never     Passive Exposure: Not on file   Financial Resource Strain: Low Risk  (5/9/2022)    Received from [email or web address]    Overall Financial Resource Strain (CARDIA)     Difficulty of Paying Living Expenses: Not hard at all   Alcohol Use: Not At Risk (5/9/2023)    Received from [email or web address]    AUDIT-C     Frequency of Alcohol Consumption: 2-4 times a month     Average Number of Drinks: 1 or 2     Frequency of Binge Drinking: Never   Transportation Needs: No Transportation Needs (5/9/2022)    Received from [email or web address]    PRAPARE - Transportation     Lack of Transportation (Medical): No     Lack of Transportation (Non-Medical): No   Physical Activity: Insufficiently Active (5/9/2023)    Received from [email or web address]    Exercise Vital Sign     Days of Exercise per Week: 1 day     Minutes of Exercise per Session: 10 min   Interpersonal Safety: Not At Risk (5/9/2023)    [facility] from [email or web address]    Humiliation, Afraid, Rape, and Kick questionnaire     Fear of Current or Ex-Partner: No     Emotionally Abused: No     Physically Abused: No     Sexually Abused: No   Stress: No Stress Concern Present (5/9/2023)    [facility] from [email or web address]    [place] [employer] - Occupational Stress Questionnaire     Feeling of Stress : Only a little   Social Connections: Socially Isolated (5/9/2023)    Received from [email or web address]    Social Connection and Isolation Panel [NHANES]     Frequency of Communication with Friends and Family: More than three times a week     Frequency of Social Gatherings with  Friends and Family: Once a week     Attends [employer] Services: Never     Active Member of Clubs or Organizations: No     Attends Club or Organization Meetings: Never     Marital Status:    Health Literacy: Not on file       Functional Status:  Prior to admission patient needed assistance:              Mental Health Status:  Mental Health Status: No Current Concerns       Chemical Dependency Status:  Chemical Dependency Status: No Current Concerns             Values/Beliefs:  Spiritual, Cultural Beliefs, Advent Practices, Values that affect care: no               Additional Information:  CM/SW consult for discharge planning. SW reviewed chart. PT/OT recommend TCU. SW met with pt and her sisters, Larisa. Discussed/explained TCU. All are in agreement. Pt lives alone in her own home. Sisters are supportive and assist as needed. Will send referrals to [facility] and [place] [facility]. Reviewed [facility] coverage. Pt is agreeable to a private room and paying to additional fee per day. SW following for discharge planning.    FELIX Weems, [facility]  [phone] Desk phone  [phone] Cell/text (Preferred)  [facility]

## 2024-05-29 NOTE — PROGRESS NOTES
Patient is AO X 4; VSS; on RA; hearing is impaired.   Ct is to be removed; incontinent.  Amita is able to swallow pills whole with water. She is in no acute discomfort.   Medical-surgical status.

## 2024-05-29 NOTE — PROGRESS NOTES
"THORACIC SURGERY PROGRESS NOTE    S: Doing okay, denies any shortness of breath.    O: /79   Pulse 107   Temp 97.4  F (36.3  C) (Axillary)   Resp (!) 31   Ht 1.626 m (5' 4\")   Wt 68.8 kg (151 lb 10.8 oz)   SpO2 94%   BMI 26.04 kg/m    Gen: Sitting up in chair, alert  Resp: Regular, on room air with SpO2 at 94%  CT: About 150 ml out in past 24 hours.    CXR: Overall well drained right pleural space. No ptx. More likely atelectasis than remaining fluid. Could repeat CT if clinically worsening, but will hold off for now given her overall improvement.    Plan:  - CT okay to be removed with low outputs. Messaged IR to remove.  - Okay to discontinue daily CXR for chest tube monitoring    Inez Caballero PA-C with Dr. Garo [facility] Thoracic Surgery  Office: [phone]  Cell: [phone]    "

## 2024-05-30 ENCOUNTER — APPOINTMENT (OUTPATIENT)
Dept: OCCUPATIONAL THERAPY | Facility: CLINIC | Age: 79
DRG: 871 | End: 2024-05-30
Payer: COMMERCIAL

## 2024-05-30 ENCOUNTER — APPOINTMENT (OUTPATIENT)
Dept: PHYSICAL THERAPY | Facility: CLINIC | Age: 79
DRG: 871 | End: 2024-05-30
Payer: COMMERCIAL

## 2024-05-30 LAB
POTASSIUM SERPL-SCNC: 3.4 MMOL/L (ref 3.4–5.3)
POTASSIUM SERPL-SCNC: 5 MMOL/L (ref 3.4–5.3)

## 2024-05-30 PROCEDURE — 99232 SBSQ HOSP IP/OBS MODERATE 35: CPT | Performed by: INTERNAL MEDICINE

## 2024-05-30 PROCEDURE — 120N000013 HC R&B IMCU

## 2024-05-30 PROCEDURE — 36415 COLL VENOUS BLD VENIPUNCTURE: CPT | Performed by: INTERNAL MEDICINE

## 2024-05-30 PROCEDURE — 84132 ASSAY OF SERUM POTASSIUM: CPT | Performed by: HOSPITALIST

## 2024-05-30 PROCEDURE — 97535 SELF CARE MNGMENT TRAINING: CPT | Mod: GO

## 2024-05-30 PROCEDURE — 250N000011 HC RX IP 250 OP 636: Performed by: INTERNAL MEDICINE

## 2024-05-30 PROCEDURE — 250N000013 HC RX MED GY IP 250 OP 250 PS 637

## 2024-05-30 PROCEDURE — 36415 COLL VENOUS BLD VENIPUNCTURE: CPT | Performed by: HOSPITALIST

## 2024-05-30 PROCEDURE — 84132 ASSAY OF SERUM POTASSIUM: CPT | Performed by: INTERNAL MEDICINE

## 2024-05-30 PROCEDURE — 97530 THERAPEUTIC ACTIVITIES: CPT | Mod: GP

## 2024-05-30 PROCEDURE — 250N000013 HC RX MED GY IP 250 OP 250 PS 637: Performed by: HOSPITALIST

## 2024-05-30 PROCEDURE — 250N000013 HC RX MED GY IP 250 OP 250 PS 637: Performed by: INTERNAL MEDICINE

## 2024-05-30 RX ORDER — LOPERAMIDE HCL 2 MG
2 CAPSULE ORAL ONCE
Status: COMPLETED | OUTPATIENT
Start: 2024-05-30 | End: 2024-05-30

## 2024-05-30 RX ORDER — POTASSIUM CHLORIDE 1500 MG/1
40 TABLET, EXTENDED RELEASE ORAL ONCE
Qty: 2 TABLET | Refills: 0 | Status: COMPLETED | OUTPATIENT
Start: 2024-05-30 | End: 2024-05-30

## 2024-05-30 RX ORDER — BUPROPION HYDROCHLORIDE 150 MG/1
150 TABLET ORAL DAILY
DISCHARGE
Start: 2024-05-31

## 2024-05-30 RX ORDER — ACETAMINOPHEN 325 MG/1
650 TABLET ORAL EVERY 4 HOURS PRN
DISCHARGE
Start: 2024-05-30

## 2024-05-30 RX ORDER — METHYLPHENIDATE HYDROCHLORIDE 5 MG/1
5 TABLET ORAL 4 TIMES DAILY
Status: SHIPPED | DISCHARGE
Start: 2024-05-30

## 2024-05-30 RX ADMIN — CEFTRIAXONE SODIUM 2 G: 2 INJECTION, POWDER, FOR SOLUTION INTRAMUSCULAR; INTRAVENOUS at 08:52

## 2024-05-30 RX ADMIN — LEVOTHYROXINE SODIUM 100 MCG: 100 TABLET ORAL at 06:16

## 2024-05-30 RX ADMIN — PANTOPRAZOLE SODIUM 40 MG: 40 TABLET, DELAYED RELEASE ORAL at 17:11

## 2024-05-30 RX ADMIN — METHYLPHENIDATE HYDROCHLORIDE 5 MG: 5 TABLET ORAL at 17:11

## 2024-05-30 RX ADMIN — METHYLPHENIDATE HYDROCHLORIDE 5 MG: 5 TABLET ORAL at 08:53

## 2024-05-30 RX ADMIN — METHYLPHENIDATE HYDROCHLORIDE 5 MG: 5 TABLET ORAL at 21:14

## 2024-05-30 RX ADMIN — LOPERAMIDE HYDROCHLORIDE 2 MG: 2 CAPSULE ORAL at 12:21

## 2024-05-30 RX ADMIN — PANTOPRAZOLE SODIUM 40 MG: 40 TABLET, DELAYED RELEASE ORAL at 06:16

## 2024-05-30 RX ADMIN — BUPROPION HYDROCHLORIDE 150 MG: 150 TABLET, EXTENDED RELEASE ORAL at 08:53

## 2024-05-30 RX ADMIN — POTASSIUM CHLORIDE 40 MEQ: 1500 TABLET, EXTENDED RELEASE ORAL at 08:52

## 2024-05-30 RX ADMIN — METHYLPHENIDATE HYDROCHLORIDE 5 MG: 5 TABLET ORAL at 12:21

## 2024-05-30 ASSESSMENT — ACTIVITIES OF DAILY LIVING (ADL)
ADLS_ACUITY_SCORE: 50
ADLS_ACUITY_SCORE: 47
ADLS_ACUITY_SCORE: 54
ADLS_ACUITY_SCORE: 48
ADLS_ACUITY_SCORE: 54
ADLS_ACUITY_SCORE: 50
ADLS_ACUITY_SCORE: 50
ADLS_ACUITY_SCORE: 54
ADLS_ACUITY_SCORE: 48
ADLS_ACUITY_SCORE: 50
ADLS_ACUITY_SCORE: 54
ADLS_ACUITY_SCORE: 54
ADLS_ACUITY_SCORE: 49
ADLS_ACUITY_SCORE: 54
ADLS_ACUITY_SCORE: 47
ADLS_ACUITY_SCORE: 49
ADLS_ACUITY_SCORE: 54
ADLS_ACUITY_SCORE: 50
ADLS_ACUITY_SCORE: 54

## 2024-05-30 NOTE — PROGRESS NOTES
[facility]    Hospitalist Progress Note    Assessment & Plan   Date of Admission:  5/24/2024        Assessment & Plan  Amita Yates is a 79 year old female with pmh of hypertension, depression, thyroid disease admitted on 5/24/2024 with respiratory failure from a large loculated pleural effusion/empyema.  She has a chest tube in place     Large left loculated pleural effusion/empyema, with rightward mediastinal shift  Acute hypoxic respiratory failure due to above   Sepsis due to above and possibly GPC bacteremia  Patient with dyspnea.  Noted leukocytosis, tachycardia and CXR of complete opacification of the left hemithorax  CT: Large loculated left pleural effusion with significant mass effect, and rightward mediastinal shift. Mild smooth right pleural thickening.  Chest tube placed in the ED, thoracic surgery consulted  -tachycardia resolved. Weaned off oxygen,   -leukocytosis resolved  -- CT placed L pleural space, followed by ID and thoracic surgery  --Continued with lytics through 5/27, serial chest x-ray shows initial maked improvement in left effusion although stable to increased bibasilar parenchymal opacity in recent XRs and still with moderate amount of left pleural fluid.   -cT removed by IR 5/29.   -treated vanco zosyn initially then change to Ceftriaxone. Followed by ID.   -lactic acid level normlalized, ivfs stopped   -Blood culture is growing 1/2 bottles GPC with negative Verigene.  Fluid culture is growing strept intermedius. Blood cultures grew aerococcus, thought to be contaminant.      Plan;   -Weaned off supplemental oxygen,   -encourage I-S    -Plan is IV while in hospital with eventual PO (augmentin for 2 weeks after CT removal)    GI  Diarrhea  - loose stools with po. 2-3 per day. In hospital. Not subacute or chronic  - no abd pain. No Bowel meds  - benign abd exam  -suspect abx related  - immodium X 1       AG/Lactic acidosis likely from sepsis  Resolved with  "IV fluid and antibiotic as above     FEN  Hypernatremia - resolved   Hypokalemia - corrected   Off IVF  Monitor electrolytes   -bmp at tcu      Hypertension   BP controlled off antihypertensive     Gastroesophageal reflux disease   Continue proton pump inhibitor      Hypothyroidism   Continue levothyroxine     Depression  Apparently she has not been taking her medications.  -on bupropion, citalopram, methylphenidate PTA  Psychiatry consulted for recommendations on these medications and methylphenidate and bupropion resumed at lower dosages 5/28, recommending resuming home dose in the next few days.  Citalopram discontinued.     Osteoporosis  Hold prior to admission Forteo and Evenity         Diet: Combination Diet Regular Diet Adult  Room Service    DVT Prophylaxis: Pneumatic Compression Devices  Mehta Catheter: Not present  Lines: None     Cardiac Monitoring: None  Code Status: Full Code          Clinically Significant Risk Factors[]Expand by Default         # Hypokalemia: Lowest K = 3.3 mmol/L in last 2 days, will replace as needed       # Hypoalbuminemia: Lowest albumin = 2.4 g/dL at 5/25/2024  6:02 AM, will monitor as appropriate            # Overweight: Estimated body mass index is 26.04 kg/m  as calculated from the following:    Height as of this encounter: 1.626 m (5' 4\").    Weight as of this encounter: 68.8 kg (151 lb 10.8 oz).                    Disposition Plan     Medically Ready for Discharge: Anticipated tomorrow, 5/31                 discussed care plan with arleen, pt, rn    Oleg Gonzalez MD, MD  Text Page  (7am to 6pm)  Interval History   Having loose stools with eating.   No abd pain  No chronic diarrhea issues   3 stools yest, 2 today.   Taking po   A1 with transfers walking     CT removed yesterday  No sob or chest pain     -Data reviewed today: I reviewed all new labs and imaging results over the last 24 hours. I personally reviewed labs last 24 hours     Physical Exam   Temp: 98.3  F (36.8  C) " Temp src: Oral BP: 138/73 Pulse: 98   Resp: 16 SpO2: 95 % O2 Device: None (Room air)    Vitals:    05/24/24 1349 05/29/24 0343   Weight: 70.4 kg (155 lb 3.3 oz) 68.8 kg (151 lb 10.8 oz)     Vital Signs with Ranges  Temp:  [97.4  F (36.3  C)-98.4  F (36.9  C)] 98.3  F (36.8  C)  Pulse:  [] 98  Resp:  [15-31] 16  BP: (120-138)/(61-80) 138/73  SpO2:  [93 %-95 %] 95 %  I/O last 3 completed shifts:  In: -   Out: 100 [Urine:100]    Constitutional: Up in chair, nad  Neck- nl jvp  Respiratory: Breathing easily   Decreased BS bibasilar   Cardiovascular: RRR no r/g/m  GI: soft, nt, nd  Skin/Integumen: no rash or edema  Neuro: nl speech and mentation  [place]   Psych: nl affect        Medications   Current Facility-Administered Medications   Medication Dose Route Frequency Provider Last Rate Last Admin     Current Facility-Administered Medications   Medication Dose Route Frequency Provider Last Rate Last Admin    buPROPion (WELLBUTRIN XL) 24 hr tablet 150 mg  150 mg Oral Daily Ger Marcus MD   150 mg at 05/30/24 0853    cefTRIAXone (ROCEPHIN) 2 g vial to attach to  ml bag for ADULTS or NS 50 ml bag for PEDS  2 g Intravenous Q24H Blair Jeffries MD   2 g at 05/30/24 0852    levothyroxine (SYNTHROID/LEVOTHROID) tablet 100 mcg  100 mcg Oral Daily Palak Pitts RPH   100 mcg at 05/30/24 0616    methylphenidate (RITALIN) tablet 5 mg  5 mg Oral 4x Daily Ger Marcus MD   5 mg at 05/30/24 0853    pantoprazole (PROTONIX) EC tablet 40 mg  40 mg Oral BID AC Ger Marcus MD   40 mg at 05/30/24 0616    sodium chloride (PF) 0.9% PF flush 3 mL  3 mL Intracatheter Q8H Ger Marcus MD   3 mL at 05/30/24 0853       Data   Recent Labs   Lab 05/30/24  0551 05/29/24  0605 05/28/24  1152 05/28/24  0554 05/27/24  0545 05/26/24  1223 05/26/24  0615 05/25/24  1555 05/25/24  0602 05/24/24  2109 05/24/24  1342   WBC  --  9.9  --   --  10.1  --  9.7  --  12.5*  --  17.9*   HGB  --  11.6*  --   --  12.0  --  10.7*  --   12.4  --  12.8   MCV  --  90  --   --  89  --  90  --  90  --  89   PLT  --  446  --   --  469*  --  501*  --  576*  --  801*   NA  --  141  --   --  142  --  138  --  147*  --  143   POTASSIUM 3.4 3.6 3.8   < > 3.5   < > 3.4   < > 3.3*  --  3.7   CHLORIDE  --  107  --   --  108*  --  105  --  109*  --  97*   CO2  --  28  --   --  26  --  25  --  26  --  27   BUN  --  7.0*  --   --  9.6  --  16.5  --  24.0*  --  32.2*   CR  --  0.55  --   --  0.65  --  0.69  --  0.68  --  0.63   ANIONGAP  --  6*  --   --  8  --  8  --  12  --  19*   BLAKE  --  7.9*  --   --  7.8*  --  7.8*  --  7.9*  --  8.7*   GLC  --  120*  --   --  130*  --  174*  --  105*   < > 181*   ALBUMIN  --   --   --   --   --   --   --   --  2.4*  --  2.7*   PROTTOTAL  --   --   --   --   --   --   --   --  5.7*  --  6.6   BILITOTAL  --   --   --   --   --   --   --   --  0.5  --  0.7   ALKPHOS  --   --   --   --   --   --   --   --  170*  --  182*   ALT  --   --   --   --   --   --   --   --  50  --  51*   AST  --   --   --   --   --   --   --   --  56*  --  63*    < > = values in this interval not displayed.       Imaging:   No results found for this or any previous visit (from the past 24 hour(s)).

## 2024-05-30 NOTE — PLAN OF CARE
Goal Outcome Evaluation:    Orientations: A&Ox4. [place], communication devices: phone microphone/ white board.  Vitals/Pain: Vitals stable on RA. Denies pain.  Lungs: clear, dim. Denies chest pain or SOB.Pulmonary toilet encouraged.  Tele: n/a  Diet: tolerating regular diet  Lines/Drains: R PIV, SL. purewick in place.  Skin/Wounds: L CT site, dressing CDI. Redness to sacrum/buttocks mepilex in place  GI/: incontinent of B & B. Had  incont BMs and urine.  Labs/Abnormal/Trends/Electrolyte Replacement: On K protocol.    Sleep quality. Good.  Ambulation/Assist: Ax1 gb/walker. Assisted with Turn/Repo.   Plan: TCU at discharge, awaiting TCU placement

## 2024-05-30 NOTE — PROGRESS NOTES
Care Management Follow Up    Length of Stay (days): 6    Expected Discharge Date: 05/31/2024     Concerns to be Addressed: discharge planning     Patient plan of care discussed at interdisciplinary rounds: Yes    Anticipated Discharge Disposition: Transitional Care     Anticipated Discharge Services:    Anticipated Discharge DME:      Patient/family educated on Medicare website which has current facility and service quality ratings: yes  Education Provided on the Discharge Plan: Yes  Patient/Family in Agreement with the Plan: yes    Referrals Placed by CM/SW: Post Acute Facilities  Private pay costs discussed: transportation costs    Additional Information:  Pt accepted to [facility] for Friday. SW updated pt and her sisters. All are in agreement. She will need transportation. SW explained pt will get billed for the cost of the ride. SW following.     FELIX Weems, [facility]  [phone] Desk phone  [phone] Cell/text (Preferred)  [facility]

## 2024-05-30 NOTE — PROGRESS NOTES
[facility]  Infectious Disease Progress Note          Assessment and Plan:   Date of Admission:  5/24/2024  Date of Consult (When I saw the patient): 05/27/24        Assessment & Plan  Amita Yates is a 79 year old who was admitted on 5/24/2024.      Impression:   1. 79-year-old female, acute left empyema, strep intermedius and cultures, now with chest tube and lytics with progressive clinical improvement  2. Positive blood culture for Aerococcus viridans likely contaminant, conceivably part of the pleural infection if so covered by same antibiotics  3 hearing loss     REc   1. Ceftriaxone while admitted  2. Chest tube removed 5/ 29   If doing well oral Augmentin to go home oral 2 weeks course   Will follow peripherally   If worsening symptoms repeat imaging            Interval History:     no new complaints a temp down, white count 10,000  mild pain, feels okay not having fever symptoms, no other focal symptoms cultures same  [place] loss utilizes I phone               Medications:     Current Facility-Administered Medications   Medication Dose Route Frequency Provider Last Rate Last Admin    buPROPion (WELLBUTRIN XL) 24 hr tablet 150 mg  150 mg Oral Daily Ger Marcus MD   150 mg at 05/30/24 0853    cefTRIAXone (ROCEPHIN) 2 g vial to attach to  ml bag for ADULTS or NS 50 ml bag for PEDS  2 g Intravenous Q24H Blair Jeffries MD   2 g at 05/30/24 0852    levothyroxine (SYNTHROID/LEVOTHROID) tablet 100 mcg  100 mcg Oral Daily Palak Pitts RPH   100 mcg at 05/30/24 0616    methylphenidate (RITALIN) tablet 5 mg  5 mg Oral 4x Daily Ger Marcus MD   5 mg at 05/30/24 0853    pantoprazole (PROTONIX) EC tablet 40 mg  40 mg Oral BID AC Ger Marcus MD   40 mg at 05/30/24 0616    sodium chloride (PF) 0.9% PF flush 3 mL  3 mL Intracatheter Q8H Ger Marcus MD   3 mL at 05/30/24 0853                  Physical Exam:   Blood pressure 138/73, pulse 98, temperature 98.3  " F (36.8  C), temperature source Oral, resp. rate 16, height 1.626 m (5' 4\"), weight 68.8 kg (151 lb 10.8 oz), SpO2 95%.  Wt Readings from Last 2 Encounters:   05/29/24 68.8 kg (151 lb 10.8 oz)   10/15/12 71.2 kg (157 lb)     Vital Signs with Ranges  Temp:  [97.4  F (36.3  C)-98.4  F (36.9  C)] 98.3  F (36.8  C)  Pulse:  [] 98  Resp:  [15-31] 16  BP: (120-138)/(61-80) 138/73  SpO2:  [93 %-95 %] 95 %    Constitutional: Awake, alert, cooperative, no apparent distress   mildly sleepy may be slight confusion     Lungs: CT sounds auscultation bilaterally, no crackles or wheezing   Cardiovascular: Regular rate and rhythm, normal S1 and S2, and no murmur noted   Abdomen: Normal bowel sounds, soft, non-distended, non-tender   Skin: No rashes, no cyanosis, no edema   Other:           Data:   All microbiology laboratory data reviewed.  Recent Labs   Lab Test 05/29/24  0605 05/27/24  0545 05/26/24  0615   WBC 9.9 10.1 9.7   HGB 11.6* 12.0 10.7*   HCT 37.2 38.2 34.4*   MCV 90 89 90    469* 501*     Recent Labs   Lab Test 05/29/24  0605 05/27/24  0545 05/26/24  0615   CR 0.55 0.65 0.69     No lab results found.  No lab results found.    Invalid input(s): \"UC\"     "

## 2024-05-30 NOTE — PLAN OF CARE
Pt is AOx4, very [place], using smart phone dictation to communicate, L chest tube site wnl, no crepitus, dressing in place, denies SOB, some COX noted while ambulating, remains >92% on RA, lungs diminished, coarse crackles on LLL, K replaced, recheck am, Diarrhea slowing down with imodium, incontinent management, good appetite, ambulated x2 this shift, R knee abrasion, mepilex on, sacral mepilex on, no wound noted under. Repositioned q2hrs, plan to cont abx.

## 2024-05-30 NOTE — PROGRESS NOTES
[facility]    Hospitalist Progress Note    Assessment & Plan   Date of Admission:  5/24/2024        Assessment & Plan  Amita Yates is a 79 year old female with pmh of hypertension, depression, thyroid disease admitted on 5/24/2024 with respiratory failure from a large loculated pleural effusion/empyema.  She has a chest tube in place     Large left loculated pleural effusion/empyema, with rightward mediastinal shift  Acute hypoxic respiratory failure due to above   Sepsis due to above and possibly GPC bacteremia  Patient with dyspnea.  Noted leukocytosis, tachycardia and CXR of complete opacification of the left hemithorax  CT: Large loculated left pleural effusion with significant mass effect, and rightward mediastinal shift. Mild smooth right pleural thickening.  Chest tube placed in the ED, thoracic surgery consulted  -tachycardia resolved. Weaned off oxygen,   -leukocytosis resolved  -- CT placed L pleural space, followed by ID and thoracic surgery  --Continued with lytics through 5/27, serial chest x-ray shows initial maked improvement in left effusion although stable to increased bibasilar parenchymal opacity in recent XRs and still with moderate amount of left pleural fluid.   -cT removed by IR 5/29.   -treated vanco zosyn initially then change to Ceftriaxone. Followed by ID.   -lactic acid level normlalized, ivfs stopped   -Blood culture is growing 1/2 bottles GPC with negative Verigene.  Fluid culture is growing strept intermedius. Blood cultures grew aerococcus, thought to be contaminant.      Plan;   -Weaned off supplemental oxygen,   -encourage I-S    -Plan is IV while in hospital with eventual PO (augmentin for 2 weeks after CT removal)    GI  Diarrhea  - loose stools with po. 2-3 per day. In hospital. Not subacute or chronic  - no abd pain. No Bowel meds  - benign abd exam  -suspect abx related  - immodium X 1       AG/Lactic acidosis likely from sepsis  Resolved with  "IV fluid and antibiotic as above     FEN  Hypernatremia - resolved   Hypokalemia - corrected   Off IVF  Monitor electrolytes   -bmp at tcu      Hypertension   BP controlled off antihypertensive     Gastroesophageal reflux disease   Continue proton pump inhibitor      Hypothyroidism   Continue levothyroxine     Depression  Apparently she has not been taking her medications.  -on bupropion, citalopram, methylphenidate PTA  Psychiatry consulted for recommendations on these medications and methylphenidate and bupropion resumed at lower dosages 5/28, recommending resuming home dose in the next few days.  Citalopram discontinued.     Osteoporosis  Hold prior to admission Forteo and Evenity         Diet: Combination Diet Regular Diet Adult  Room Service    DVT Prophylaxis: Pneumatic Compression Devices  Mehta Catheter: Not present  Lines: None     Cardiac Monitoring: None  Code Status: Full Code          Clinically Significant Risk Factors[]Expand by Default         # Hypokalemia: Lowest K = 3.3 mmol/L in last 2 days, will replace as needed       # Hypoalbuminemia: Lowest albumin = 2.4 g/dL at 5/25/2024  6:02 AM, will monitor as appropriate            # Overweight: Estimated body mass index is 26.04 kg/m  as calculated from the following:    Height as of this encounter: 1.626 m (5' 4\").    Weight as of this encounter: 68.8 kg (151 lb 10.8 oz).                    Disposition Plan     Medically Ready for Discharge: Anticipated tomorrow, 5/31              moderate complex decision making, >35 min spent on patient care including coord care with arleen, pt, rn, , discharge planning, exam, charting and chart review   discussed care plan with arleen, pt, rn    Oleg Gonzalez MD, MD  Text Page  (7am to 6pm)  Interval History   Having loose stools with eating.   No abd pain  No chronic diarrhea issues   3 stools yest, 2 today.   Taking po   A1 with transfers walking     CT removed yesterday  No sob or chest pain     -Data " reviewed today: I reviewed all new labs and imaging results over the last 24 hours. I personally reviewed labs last 24 hours     Physical Exam   Temp: 98.4  F (36.9  C) Temp src: Oral BP: 139/80 Pulse: 104   Resp: 16 SpO2: 93 % O2 Device: None (Room air)    Vitals:    05/24/24 1349 05/29/24 0343   Weight: 70.4 kg (155 lb 3.3 oz) 68.8 kg (151 lb 10.8 oz)     Vital Signs with Ranges  Temp:  [98.3  F (36.8  C)-98.4  F (36.9  C)] 98.4  F (36.9  C)  Pulse:  [] 104  Resp:  [16-28] 16  BP: (124-139)/(61-80) 139/80  SpO2:  [93 %-95 %] 93 %  I/O last 3 completed shifts:  In: 300 [P.O.:300]  Out: 100 [Urine:100]    Constitutional: Up in chair, nad  Neck- nl jvp  Respiratory: Breathing easily   Decreased BS bibasilar   Cardiovascular: RRR no r/g/m  GI: soft, nt, nd  Skin/Integumen: no rash or edema  Neuro: nl speech and mentation  [place]   Psych: nl affect        Medications   Current Facility-Administered Medications   Medication Dose Route Frequency Provider Last Rate Last Admin     Current Facility-Administered Medications   Medication Dose Route Frequency Provider Last Rate Last Admin    buPROPion (WELLBUTRIN XL) 24 hr tablet 150 mg  150 mg Oral Daily Ger Marcus MD   150 mg at 05/30/24 0853    cefTRIAXone (ROCEPHIN) 2 g vial to attach to  ml bag for ADULTS or NS 50 ml bag for PEDS  2 g Intravenous Q24H Blair Jeffries MD   2 g at 05/30/24 0852    levothyroxine (SYNTHROID/LEVOTHROID) tablet 100 mcg  100 mcg Oral Daily Palak Pitts RPH   100 mcg at 05/30/24 0616    methylphenidate (RITALIN) tablet 5 mg  5 mg Oral 4x Daily Ger Marcus MD   5 mg at 05/30/24 1711    pantoprazole (PROTONIX) EC tablet 40 mg  40 mg Oral BID AC Ger Marcus MD   40 mg at 05/30/24 1711    sodium chloride (PF) 0.9% PF flush 3 mL  3 mL Intracatheter Q8H Ger Marcus MD   3 mL at 05/30/24 1711       Data   Recent Labs   Lab 05/30/24  1301 05/30/24  0551 05/29/24  0605 05/28/24  0554 05/27/24  0545 05/26/24  1223  05/26/24  0615 05/25/24  1555 05/25/24  0602 05/24/24  2109 05/24/24  1342   WBC  --   --  9.9  --  10.1  --  9.7  --  12.5*  --  17.9*   HGB  --   --  11.6*  --  12.0  --  10.7*  --  12.4  --  12.8   MCV  --   --  90  --  89  --  90  --  90  --  89   PLT  --   --  446  --  469*  --  501*  --  576*  --  801*   NA  --   --  141  --  142  --  138  --  147*  --  143   POTASSIUM 5.0 3.4 3.6   < > 3.5   < > 3.4   < > 3.3*  --  3.7   CHLORIDE  --   --  107  --  108*  --  105  --  109*  --  97*   CO2  --   --  28  --  26  --  25  --  26  --  27   BUN  --   --  7.0*  --  9.6  --  16.5  --  24.0*  --  32.2*   CR  --   --  0.55  --  0.65  --  0.69  --  0.68  --  0.63   ANIONGAP  --   --  6*  --  8  --  8  --  12  --  19*   BLAKE  --   --  7.9*  --  7.8*  --  7.8*  --  7.9*  --  8.7*   GLC  --   --  120*  --  130*  --  174*  --  105*   < > 181*   ALBUMIN  --   --   --   --   --   --   --   --  2.4*  --  2.7*   PROTTOTAL  --   --   --   --   --   --   --   --  5.7*  --  6.6   BILITOTAL  --   --   --   --   --   --   --   --  0.5  --  0.7   ALKPHOS  --   --   --   --   --   --   --   --  170*  --  182*   ALT  --   --   --   --   --   --   --   --  50  --  51*   AST  --   --   --   --   --   --   --   --  56*  --  63*    < > = values in this interval not displayed.       Imaging:   No results found for this or any previous visit (from the past 24 hour(s)).

## 2024-05-31 VITALS
DIASTOLIC BLOOD PRESSURE: 69 MMHG | OXYGEN SATURATION: 94 % | HEIGHT: 64 IN | WEIGHT: 151.68 LBS | HEART RATE: 98 BPM | SYSTOLIC BLOOD PRESSURE: 132 MMHG | BODY MASS INDEX: 25.89 KG/M2 | RESPIRATION RATE: 16 BRPM | TEMPERATURE: 98.1 F

## 2024-05-31 LAB
ANION GAP SERPL CALCULATED.3IONS-SCNC: 9 MMOL/L (ref 7–15)
BACTERIA PLR CULT: NORMAL
BUN SERPL-MCNC: 4.6 MG/DL (ref 8–23)
CALCIUM SERPL-MCNC: 8.4 MG/DL (ref 8.8–10.2)
CHLORIDE SERPL-SCNC: 105 MMOL/L (ref 98–107)
CREAT SERPL-MCNC: 0.53 MG/DL (ref 0.51–0.95)
DEPRECATED HCO3 PLAS-SCNC: 27 MMOL/L (ref 22–29)
EGFRCR SERPLBLD CKD-EPI 2021: >90 ML/MIN/1.73M2
GLUCOSE SERPL-MCNC: 130 MG/DL (ref 70–99)
POTASSIUM SERPL-SCNC: 3.8 MMOL/L (ref 3.4–5.3)
SODIUM SERPL-SCNC: 141 MMOL/L (ref 135–145)

## 2024-05-31 PROCEDURE — 99238 HOSP IP/OBS DSCHRG MGMT 30/<: CPT | Performed by: INTERNAL MEDICINE

## 2024-05-31 PROCEDURE — 250N000011 HC RX IP 250 OP 636: Performed by: INTERNAL MEDICINE

## 2024-05-31 PROCEDURE — 82374 ASSAY BLOOD CARBON DIOXIDE: CPT | Performed by: INTERNAL MEDICINE

## 2024-05-31 PROCEDURE — 250N000013 HC RX MED GY IP 250 OP 250 PS 637: Performed by: HOSPITALIST

## 2024-05-31 PROCEDURE — 82565 ASSAY OF CREATININE: CPT | Performed by: INTERNAL MEDICINE

## 2024-05-31 PROCEDURE — 36415 COLL VENOUS BLD VENIPUNCTURE: CPT | Performed by: INTERNAL MEDICINE

## 2024-05-31 PROCEDURE — 250N000013 HC RX MED GY IP 250 OP 250 PS 637

## 2024-05-31 RX ADMIN — BUPROPION HYDROCHLORIDE 150 MG: 150 TABLET, EXTENDED RELEASE ORAL at 10:03

## 2024-05-31 RX ADMIN — METHYLPHENIDATE HYDROCHLORIDE 5 MG: 5 TABLET ORAL at 12:41

## 2024-05-31 RX ADMIN — LEVOTHYROXINE SODIUM 100 MCG: 100 TABLET ORAL at 06:25

## 2024-05-31 RX ADMIN — CEFTRIAXONE SODIUM 2 G: 2 INJECTION, POWDER, FOR SOLUTION INTRAMUSCULAR; INTRAVENOUS at 10:49

## 2024-05-31 RX ADMIN — METHYLPHENIDATE HYDROCHLORIDE 5 MG: 5 TABLET ORAL at 10:03

## 2024-05-31 RX ADMIN — PANTOPRAZOLE SODIUM 40 MG: 40 TABLET, DELAYED RELEASE ORAL at 06:24

## 2024-05-31 ASSESSMENT — ACTIVITIES OF DAILY LIVING (ADL)
ADLS_ACUITY_SCORE: 47
ADLS_ACUITY_SCORE: 47
ADLS_ACUITY_SCORE: 49
ADLS_ACUITY_SCORE: 48
ADLS_ACUITY_SCORE: 47
ADLS_ACUITY_SCORE: 47
ADLS_ACUITY_SCORE: 49
ADLS_ACUITY_SCORE: 47
ADLS_ACUITY_SCORE: 48
ADLS_ACUITY_SCORE: 49
ADLS_ACUITY_SCORE: 47
ADLS_ACUITY_SCORE: 49
ADLS_ACUITY_SCORE: 47

## 2024-05-31 NOTE — PLAN OF CARE
Goal Outcome Evaluation:      Plan of Care Reviewed With: patient    Overall Patient Progress: improving    Pt here with large lf pleural effusion and empyema, chest tube removed yesterday, lung sound with crackles. She is [employer],[place], uses her phone to have spoken words translated in writing and that works great for her.AX1GB/W,reina reg diet, gets room service, takes pills whole with water, PIV is s/l. Sacral mepilex in place, redness on perineal area, abrasion on rt knee with mepilex intact. Pt rec TCU, SW following, pt accepted at [facility] for tomorrow, patient and family in agreement and accepted, ride needs to be scheduled.

## 2024-05-31 NOTE — DISCHARGE SUMMARY
[facility]    Discharge Summary  Hospitalist    Date of Admission:  5/24/2024  Date of Discharge:  5/31/2024  Discharging Provider: Oleg Gonzalez MD, MD    Discharge Diagnoses   Large left loculated pleural effusion/empyema, with rightward mediastinal shift  Acute hypoxic respiratory failure due to above   Sepsis due to above and possibly GPC bacteremia  S/p Left chest tube with removal 5/29.     History of Present Illness   Amita Yates is a 79 year old female with pmh of depression, hypertension, and hypothyroidism who presented initially with sob and generalized weakness and failure to thrive. She reports feeling poorly for at least 1 month since she had an injection for osteoporosis. With the increasing weakness her sister called 911 today and apparently she was in bed incontinent of stool and urine. She was 88% on room air which increased to 99% on 10 liters.     In the ED CXR with opacification of the left hemithorax and CT confirmed a large loculated empyema.     Hospital Course   Amita Yates was admitted on 5/24/2024.  The following problems were addressed during her hospitalization:    Active Problems:    Pleural effusion on left    Severe sepsis (H)    Pneumonia of left lung due to infectious organism, unspecified part of lung  Date of Admission:  5/24/2024        Assessment & Plan  Amita Yates is a 79 year old female with pmh of hypertension, depression, thyroid disease admitted on 5/24/2024 with respiratory failure from a large loculated pleural effusion/empyema.  She has a chest tube in place     Large left loculated pleural effusion/empyema, with rightward mediastinal shift  Acute hypoxic respiratory failure due to above   Sepsis due to above and possibly GPC bacteremia  Patient with dyspnea.  Noted leukocytosis, tachycardia and CXR of complete opacification of the left hemithorax  CT: Large loculated left pleural effusion with significant mass effect,  and rightward mediastinal shift. Mild smooth right pleural thickening.  Chest tube placed in the ED, thoracic surgery consulted  -tachycardia resolved. Weaned off oxygen,   -leukocytosis resolved  -- CT placed L pleural space, followed by ID and thoracic surgery  --Continued with lytics through 5/27, serial chest x-ray shows initial maked improvement in left effusion although stable to increased bibasilar parenchymal opacity in recent XRs and still with moderate amount of left pleural fluid.   -cT removed by IR 5/29.   -treated vanco zosyn initially then change to Ceftriaxone. Followed by ID.   -lactic acid level normlalized, ivfs stopped   -Blood culture is growing 1/2 bottles GPC with negative Verigene.  Fluid culture is growing strept intermedius. Blood cultures grew aerococcus, thought to be contaminant.  -pt weaned off oxygen   -feeling well and taking po  - wbc normalized. Afebrile   -IS at tcu  - TCU  - augmentin for 2 weeks at discharge per ID  -pcp follow up after tcu  - follow lung exam      GI  Diarrhea  - loose stools with po. 2-3 per day. In hospital. Not subacute or chronic  - no abd pain. No Bowel meds  - benign abd exam  -suspect abx related  - immodium X 1 on 5/30  No recurrent issues.   -benign abd exam, no gi sxs  Follow at tcu  Will be on abx which can cause some diarrhea.           AG/Lactic acidosis likely from sepsis  Resolved with IV fluid and antibiotic as above     FEN  Hypernatremia - resolved   Hypokalemia - corrected   Off IVF  Monitor electrolytes. Nl bmp day of discahrge   -bmp at tcu      Hypertension   BP controlled off antihypertensive, will stop norvasc for now and reeval at tcu and outpatient if need to be restarted     Gastroesophageal reflux disease   Continue proton pump inhibitor      Hypothyroidism   Continue levothyroxine     Depression  Apparently she has not been taking her medications.  -on bupropion, citalopram, methylphenidate PTA  Psychiatry consulted for  "recommendations on these medications and methylphenidate and bupropion resumed at lower dosages 5/28, recommending resuming home dose in the next few days.  Citalopram discontinued. Reviewed with patient  -pcp and psychiatry follow up      Osteoporosis  Hold prior to admission Forteo and Evenity         Diet: Combination Diet Regular Diet Adult  Room Service    DVT Prophylaxis: Pneumatic Compression Devices  Mehta Catheter: Not present  Lines: None     Cardiac Monitoring: None  Code Status: Full Code          Clinically Significant Risk Factors[]Expand by Default         # Hypokalemia: Lowest K = 3.3 mmol/L in last 2 days, will replace as needed       # Hypoalbuminemia: Lowest albumin = 2.4 g/dL at 5/25/2024  6:02 AM, will monitor as appropriate            # Overweight: Estimated body mass index is 26.04 kg/m  as calculated from the following:    Height as of this encounter: 1.626 m (5' 4\").    Weight as of this encounter: 68.8 kg (151 lb 10.8 oz).                    Disposition Plan- discharge to tcu     Oleg Gonzalez MD, MD    Significant Results and Procedures   See hospital course     Pending Results   none  Unresulted Labs Ordered in the Past 30 Days of this Admission       No orders found from 4/24/2024 to 5/25/2024.            Code Status   Full Code       Primary Care Physician   Richard Boss    Physical Exam   Temp: 98.1  F (36.7  C) Temp src: Oral BP: 132/69 Pulse: 98   Resp: 16 SpO2: 94 % O2 Device: None (Room air)    Vitals:    05/24/24 1349 05/29/24 0343   Weight: 70.4 kg (155 lb 3.3 oz) 68.8 kg (151 lb 10.8 oz)     Vital Signs with Ranges  Temp:  [98.1  F (36.7  C)-98.4  F (36.9  C)] 98.1  F (36.7  C)  Pulse:  [] 98  Resp:  [16-18] 16  BP: (132-139)/(69-88) 132/69  SpO2:  [92 %-94 %] 94 %  I/O last 3 completed shifts:  In: 660 [P.O.:660]  Out: -   Constitutional: nad  Respiratory:     Breathing easily   Decreased BS bibasilar slightly, nearly CTAB   Cardiovascular: RRR no r/g/m  GI: soft, " nt, nd  Skin/Integumen: no rash or edema  Neuro: nl speech and mentation  [place]   Psych: nl affect    Discharge Disposition   Discharged to short-term care facility  Condition at discharge: Good    Consultations This Hospital Stay   INTERVENTIONAL RADIOLOGY ADULT/PEDS IP CONSULT  THORACIC SURGERY IP CONSULT  PHARMACY TO DOSE VANCO  INFECTIOUS DISEASES IP CONSULT  PHYSICAL THERAPY ADULT IP CONSULT  OCCUPATIONAL THERAPY ADULT IP CONSULT  PSYCHIATRY IP CONSULT  CARE MANAGEMENT / SOCIAL WORK IP CONSULT  PHYSICAL THERAPY ADULT IP CONSULT  OCCUPATIONAL THERAPY ADULT IP CONSULT    Time Spent on this Encounter   I, Oleg Gonzalez MD, personally saw the patient today and spent less than or equal to 30 minutes discharging this patient.    Discharge Orders      General info for SNF    Length of Stay Estimate: Short Term Care: Estimated # of Days <30  Condition at Discharge: Improving  Level of care:skilled   Rehabilitation Potential: Good  Admission H&P remains valid and up-to-date: Yes  Recent Chemotherapy: N/A  Use Nursing Home Standing Orders: Yes     Mantoux instructions    Give two-step Mantoux (PPD) Per Facility Policy Yes     Follow Up and recommended labs and tests    - CBC with platelet count and BMP in 3- 4 days  - PCP follow up 1 week after TCU discharge     Reason for your hospital stay    Large left loculated pleural effusion/empyema, with rightward mediastinal shift  Acute hypoxic respiratory failure due to above   Sepsis due to above and possibly GPC bacteremia  Chest tube placement, removed 5/29/24     Activity - Ambulate in hallway    Every shift     Activity - Up with nursing assistance     Additional Discharge Instructions    - pt is very hard of hearing  -incentive spirometry every 2 hours while awake     Full Code     Physical Therapy Adult Consult    Evaluate and treat as clinically indicated.    Reason:  deconditioned     Occupational Therapy Adult Consult    Evaluate and treat as clinically  indicated.    Reason:  deconditioned     Fall precautions     Diet    Follow this diet upon discharge: Orders Placed This Encounter      Room Service      Combination Diet Regular Diet Adult     Discharge Medications   Current Discharge Medication List        START taking these medications    Details   acetaminophen (TYLENOL) 325 MG tablet Take 2 tablets (650 mg) by mouth every 4 hours as needed for mild pain or other (and adjunct with moderate or severe pain or per patient request)    Associated Diagnoses: Pleural effusion on left; Pneumonia of left lung due to infectious organism, unspecified part of lung; Septic shock (H); Severe sepsis (H)      amoxicillin-clavulanate (AUGMENTIN) 875-125 MG tablet Take 1 tablet by mouth 2 times daily for 14 days    Associated Diagnoses: Pleural effusion on left; Pneumonia of left lung due to infectious organism, unspecified part of lung; Septic shock (H); Severe sepsis (H)           CONTINUE these medications which have CHANGED    Details   buPROPion (WELLBUTRIN XL) 150 MG 24 hr tablet Take 1 tablet (150 mg) by mouth daily    Associated Diagnoses: Anxiety      methylphenidate (RITALIN) 5 MG tablet Take 1 tablet (5 mg) by mouth 4 times daily    Associated Diagnoses: Attention deficit hyperactivity disorder (ADHD), predominantly hyperactive type           CONTINUE these medications which have NOT CHANGED    Details   esomeprazole (NEXIUM) 40 MG DR capsule Take 40 mg by mouth 2 times daily Take 30-60 minutes before eating.      levothyroxine (SYNTHROID/LEVOTHROID) 100 MCG tablet Take 100 mcg by mouth daily      romosozumab-aqqg (EVENITY) 105 MG/1.17ML SOSY injection Inject 210 mg Subcutaneous every 30 days           STOP taking these medications       amLODIPine (NORVASC) 10 MG tablet Comments:   Reason for Stopping:         citalopram (CELEXA) 40 MG tablet Comments:   Reason for Stopping:         teriparatide, recombinant, (FORTEO) 600 MCG/2.4ML SOPN injection Comments:   Reason  for Stopping:             Allergies   Allergies   Allergen Reactions    Evenity [Romosozumab] Shortness Of Breath and Cough     Data   Most Recent 3 CBC's:  Recent Labs   Lab Test 05/29/24  0605 05/27/24  0545 05/26/24  0615   WBC 9.9 10.1 9.7   HGB 11.6* 12.0 10.7*   MCV 90 89 90    469* 501*      Most Recent 3 BMP's:  Recent Labs   Lab Test 05/31/24  0546 05/30/24  1301 05/30/24  0551 05/29/24  0605 05/28/24  0554 05/27/24  0545     --   --  141  --  142   POTASSIUM 3.8 5.0 3.4 3.6   < > 3.5   CHLORIDE 105  --   --  107  --  108*   CO2 27  --   --  28  --  26   BUN 4.6*  --   --  7.0*  --  9.6   CR 0.53  --   --  0.55  --  0.65   ANIONGAP 9  --   --  6*  --  8   BLAKE 8.4*  --   --  7.9*  --  7.8*   *  --   --  120*  --  130*    < > = values in this interval not displayed.     Most Recent 2 LFT's:  Recent Labs   Lab Test 05/25/24  0602 05/24/24  1342   AST 56* 63*   ALT 50 51*   ALKPHOS 170* 182*   BILITOTAL 0.5 0.7     Most Recent INR's and Anticoagulation Dosing History:  Anticoagulation Dose History           No data to display              Most Recent 3 Troponin's:No lab results found.  Most Recent Cholesterol Panel:No lab results found.  Most Recent 6 Bacteria Isolates From Any Culture (See EPIC Reports for Culture Details):No lab results found.  Most Recent TSH, T4 and A1c Labs:  Recent Labs   Lab Test 05/24/24  1342   TSH 5.15*   T4 0.87*     Results for orders placed or performed during the hospital encounter of 05/24/24   XR Chest Port 1 View    Narrative    CHEST ONE VIEW  5/24/2024 1:50 PM     HISTORY: Hypoxic.    COMPARISON: None.      Impression    IMPRESSION: Opacified left hemithorax, if this is an unexpected  finding consider CT scan of the chest for further evaluation. Question  some minimal atelectasis and/or infiltrate at the right base.    TEZ AYOUB MD         SYSTEM ID:  [id number]   CT Chest Pulmonary Embolism w Contrast    Narrative    CT CHEST PULMONARY EMBOLISM W  CONTRAST 5/24/2024 3:26 PM    CLINICAL HISTORY: chest pain  TECHNIQUE: CT angiogram chest during arterial phase injection IV  contrast. 2D and 3D MIP reconstructions were performed by the CT  technologist. Dose reduction techniques were used.     CONTRAST: 61mL Isovue-370    COMPARISON: Chest x-ray earlier today    FINDINGS:  ANGIOGRAM CHEST: Pulmonary arteries are normal caliber and negative  for pulmonary emboli. Thoracic aorta is negative for dissection. No CT  evidence of right heart strain.    LUNGS AND PLEURA: Large loculated left pleural effusion with near  complete atelectasis of the left lung. There is significant mass  effect with resultant rightward mediastinal shift. The effusion has  some mild smooth wall thickening without obvious nodular pleural based  masses.. Trace right pleural effusion. Mild linear interlobular septal  thickening and linear and patchy opacities scattered in the right  lung, likely due to edema or infection.    MEDIASTINUM/AXILLAE: No lymphadenopathy. No thoracic aortic aneurysm  moderate coronary artery calcifications. No pericardial effusion.  Moderate-sized hiatal hernia.    UPPER ABDOMEN: Small calcifications in the left adrenal gland, likely  the sequela of chronic infection. Pancreatic parenchymal atrophy.  Cholecystectomy.    MUSCULOSKELETAL: No suspicious lesions in the bones. Degenerative  changes in the spine.      Impression    IMPRESSION:  1.  Large loculated left pleural effusion with significant mass effect  and rightward mediastinal shift. Mild smooth right pleural thickening.  Findings are indeterminate, and may be infectious/inflammatory,  empyema or malignant effusion.  2.  Trace right pleural effusion and mild right lung edema/infiltrate.  3.  Moderate size hiatal hernia containing the upper half of the  stomach.    LUCAS BISHOP MD         SYSTEM ID:  [id number]   XR Chest Port 1 View    Narrative    EXAM: XR CHEST PORT 1 VIEW  LOCATION: [facility]  DATE: 5/24/2024    INDICATION: fu chest tube placement  COMPARISON: 5/24/2024      Impression    IMPRESSION: Left lower chest tube appropriately positioned with significant improvement in the pleural effusion which is now moderate in size. No right pleural effusion. Calcified aortic knob. Generalized bony demineralization.   XR Chest Port 1 View    Narrative    EXAM: XR CHEST PORT 1 VIEW  LOCATION: [facility]  DATE: 5/25/2024    INDICATION: fu chest tube and empyema  COMPARISON: 5/24/2024      Impression    IMPRESSION: Left basilar pigtail chest tube. Mild decrease in the volume of the adjacent pleural effusion. There is still some fluid along the lateral aspect of the left chest. There are increased interstitial lung markings. No pneumothorax. Unchanged   cardiac size.   XR Chest Port 1 View    Narrative    EXAM: XR CHEST PORTABLE 1 VIEW  LOCATION: [facility]  DATE: 05/26/2024    INDICATION: Follow-up chest tube and empyema.  COMPARISON: Portable chest single view 05/25/2024 at 0651 hours.      Impression    IMPRESSION: Small caliber left pleural pigtail catheter, unchanged. Improved aeration of the left lung with diminishing left basilar opacities and associated effusion seen previously. Minor strands of linear atelectasis right base. No effusion on the   right. Normal cardiac size and pulmonary vascularity. Atherosclerotic thoracic aorta. Degenerative changes both shoulders and the spine. Thoracolumbar curve, partially visualized. Monitoring leads overlying the chest.     XR Chest Port 1 View    Narrative    EXAM: XR CHEST PORT 1 VIEW  LOCATION: [facility]  DATE: 5/27/2024    INDICATION: fu chest tube and empyema  COMPARISON: 5/26/2024      Impression    IMPRESSION: Left basilar smallbore thoracostomy tube. Opacities left lung base likely relating to atelectasis and a combination of minimal residual left basilar  pleural fluid, unchanged. Left upper lung remains clear. Mild atelectasis medial right lung   base. Mild cardiac enlargement. Normal pulmonary vascularity. Aortic calcification. Severe degenerative changes both glenohumeral joints.   XR Chest Port 1 View    Narrative    EXAM: XR CHEST PORT 1 VIEW  LOCATION: [facility]  DATE: 5/28/2024    INDICATION: fu chest tube and empyema  COMPARISON: 5/27/2024      Impression    IMPRESSION: Left-sided chest tube remains in place. No significant pneumothorax. Heart size is stable. There is increasing bibasilar parenchymal opacity, atelectasis versus pneumonitis. Moderate amount of left pleural fluid persists, likely unchanged. No   acute bony abnormalities.   XR Chest Port 1 View    Narrative    EXAM: XR CHEST PORT 1 VIEW  LOCATION: [facility]  DATE: 5/29/2024    INDICATION: Follow-up chest tube and empyema.  COMPARISON: 5/28/2024.      Impression    IMPRESSION: No significant change of the left chest tube position. No significant change of left mid to lower lung opacities; differentials on radiography include atelectasis, pneumonia or pleural fluid (chest CT could be performed if necessary for   clarification). Mild right lung base atelectasis. No pneumothorax. Stable cardiomediastinal silhouette.

## 2024-05-31 NOTE — PROGRESS NOTES
Care Management Discharge Note    Discharge Date: 05/31/2024       Discharge Disposition: Transitional Care    Discharge Services:      Discharge DME:      Discharge Transportation:      Private pay costs discussed: private room/amenity fees and transportation costs    Does the patient's insurance plan have a 3 day qualifying hospital stay waiver?  No    PAS Confirmation Code: [id number]  Patient/family educated on Medicare website which has current facility and service quality ratings: yes    Education Provided on the Discharge Plan: Yes  Persons Notified of Discharge Plans: Pt, sister, RN, MD, facility  Patient/Family in Agreement with the Plan: yes    Handoff Referral Completed: No    Additional Information:  Pt will discharge today to [facility] via [facility] between [id number]. Orders faxed and facility updated. Pt updated and in agreement. Voicemail left for her sisterAngelica. Nursing aware.     FELIX Weems, [facility]  [phone] Desk phone  [phone] Cell/text (Preferred)  [facility]    PAS-RR    D: Per DHS regulation, SW completed and submitted PAS-RR to [place] [employer] Direct Connect via the Senior LinkAge Line.  PAS-RR confirmation # is : [id number]      P: Further questions may be directed to Senior LinkAge Line at #[phone], option #4 for PAS-RR staff.

## 2024-05-31 NOTE — PLAN OF CARE
Orientations: A&Ox4, very [place] - uses ashley on phone to help or whiteboard  Vitals/Pain: VSS on room air. Denies pain  Tele: N/A.  Lines/Drains: PIV-SL.  Skin/Wounds: Old CT site on L side. Blanchable redness on coccyx, mepilex in place.  GI/: Voiding. No BM overnight, was having loose stools previously, has prn imodium available. Regular diet  Labs: Abnormal/Trends, Electrolyte Replacement- K protocol, recheck in AM  Ambulation/Assist: Assist of 1 with GB/walker.  Sleep Quality: Good  Plan: Plan to discharge today to TCU ([facility] [place])

## 2024-05-31 NOTE — PLAN OF CARE
Physical Therapy Discharge Summary    Reason for therapy discharge:    Discharged to transitional care facility.    Progress towards therapy goal(s). See goals on Care Plan in [facility] electronic health record for goal details.  Goals partially met.  Barriers to achieving goals:   discharge from facility.    Therapy recommendation(s):    Continued therapy is recommended.  Rationale/Recommendations:   . Pt is below baseline of being IND living at home. Currently with deficits with generalized strength, balance, acitivty tolerance, and is a falls risk. Pt lives alone and would not be safe to return home without assist/supervision 24/7 for safety and pt will need to continue using walker to mobilize. Recommend TCU to address deficits before being safe to return home  PT Brief overview of current status: [facility] with FWW  PT Equipment Needed at Discharge: walker, rolling      Recommendation above provided by last treating therapist.

## 2024-05-31 NOTE — PLAN OF CARE
Orientation: A/Ox4; [place], uses white board or phone to communicate   Vitals: VSS on RA   Mobility: A1 GBW  Diet: tolerating regular diet  GI/: incontinent at times; voiding adequately; no BM, +BS  Pain: pt denies pain  Drains/Devices: PIV x1 saline locked  Skin: blanchable redness on sacrum/coccyx; scattered bruising    Pt discharged to TCU; PIV removed

## 2024-05-31 NOTE — PLAN OF CARE
Occupational Therapy Discharge Summary    Reason for therapy discharge:    Discharged to transitional care facility.    Progress towards therapy goal(s). See goals on Care Plan in [facility] electronic health record for goal details.  Goals partially met.  Barriers to achieving goals:   discharge from facility.    Therapy recommendation(s):    Continued therapy is recommended.  Rationale/Recommendations:  Pt below baseline, was previously independent and living alone, now Ax1 for ADL tasks and mobility. Recommend TCU to address safety and IND in I/ADLs.

## 2024-06-01 ENCOUNTER — LAB REQUISITION (OUTPATIENT)
Dept: LAB | Facility: CLINIC | Age: 79
End: 2024-06-01
Payer: COMMERCIAL

## 2024-06-01 DIAGNOSIS — D72.829 ELEVATED WHITE BLOOD CELL COUNT, UNSPECIFIED: ICD-10-CM

## 2024-06-01 DIAGNOSIS — I10 ESSENTIAL (PRIMARY) HYPERTENSION: ICD-10-CM

## 2024-06-03 LAB
ANION GAP SERPL CALCULATED.3IONS-SCNC: 10 MMOL/L (ref 7–15)
BUN SERPL-MCNC: 7.1 MG/DL (ref 8–23)
CALCIUM SERPL-MCNC: 8.7 MG/DL (ref 8.8–10.2)
CHLORIDE SERPL-SCNC: 104 MMOL/L (ref 98–107)
CREAT SERPL-MCNC: 0.66 MG/DL (ref 0.51–0.95)
DEPRECATED HCO3 PLAS-SCNC: 26 MMOL/L (ref 22–29)
EGFRCR SERPLBLD CKD-EPI 2021: 89 ML/MIN/1.73M2
ERYTHROCYTE [DISTWIDTH] IN BLOOD BY AUTOMATED COUNT: 17.1 % (ref 10–15)
GLUCOSE SERPL-MCNC: 105 MG/DL (ref 70–99)
HCT VFR BLD AUTO: 37.8 % (ref 35–47)
HGB BLD-MCNC: 11.9 G/DL (ref 11.7–15.7)
MCH RBC QN AUTO: 28.5 PG (ref 26.5–33)
MCHC RBC AUTO-ENTMCNC: 31.5 G/DL (ref 31.5–36.5)
MCV RBC AUTO: 90 FL (ref 78–100)
PLATELET # BLD AUTO: 379 10E3/UL (ref 150–450)
POTASSIUM SERPL-SCNC: 3.7 MMOL/L (ref 3.4–5.3)
RBC # BLD AUTO: 4.18 10E6/UL (ref 3.8–5.2)
SODIUM SERPL-SCNC: 140 MMOL/L (ref 135–145)
WBC # BLD AUTO: 10.9 10E3/UL (ref 4–11)

## 2024-06-03 PROCEDURE — P9604 ONE-WAY ALLOW PRORATED TRIP: HCPCS | Mod: ORL | Performed by: INTERNAL MEDICINE

## 2024-06-03 PROCEDURE — 36415 COLL VENOUS BLD VENIPUNCTURE: CPT | Mod: ORL | Performed by: INTERNAL MEDICINE

## 2024-06-03 PROCEDURE — 80048 BASIC METABOLIC PNL TOTAL CA: CPT | Mod: ORL | Performed by: INTERNAL MEDICINE

## 2024-06-03 PROCEDURE — 85027 COMPLETE CBC AUTOMATED: CPT | Mod: ORL | Performed by: INTERNAL MEDICINE

## 2024-06-19 ENCOUNTER — LAB REQUISITION (OUTPATIENT)
Dept: LAB | Facility: CLINIC | Age: 79
End: 2024-06-19
Payer: COMMERCIAL

## 2024-06-19 DIAGNOSIS — R19.7 DIARRHEA, UNSPECIFIED: ICD-10-CM

## 2024-06-20 LAB
ANION GAP SERPL CALCULATED.3IONS-SCNC: 12 MMOL/L (ref 7–15)
BUN SERPL-MCNC: 4.1 MG/DL (ref 8–23)
CALCIUM SERPL-MCNC: 8.6 MG/DL (ref 8.8–10.2)
CHLORIDE SERPL-SCNC: 102 MMOL/L (ref 98–107)
CREAT SERPL-MCNC: 0.51 MG/DL (ref 0.51–0.95)
DEPRECATED HCO3 PLAS-SCNC: 28 MMOL/L (ref 22–29)
EGFRCR SERPLBLD CKD-EPI 2021: >90 ML/MIN/1.73M2
GLUCOSE SERPL-MCNC: 109 MG/DL (ref 70–99)
POTASSIUM SERPL-SCNC: 3.5 MMOL/L (ref 3.4–5.3)
SODIUM SERPL-SCNC: 142 MMOL/L (ref 135–145)

## 2024-06-20 PROCEDURE — 36415 COLL VENOUS BLD VENIPUNCTURE: CPT | Mod: ORL | Performed by: NURSE PRACTITIONER

## 2024-06-20 PROCEDURE — P9603 ONE-WAY ALLOW PRORATED MILES: HCPCS | Mod: ORL | Performed by: NURSE PRACTITIONER

## 2024-06-20 PROCEDURE — 80048 BASIC METABOLIC PNL TOTAL CA: CPT | Mod: ORL | Performed by: NURSE PRACTITIONER

## 2024-08-04 ENCOUNTER — HEALTH MAINTENANCE LETTER (OUTPATIENT)
Age: 79
End: 2024-08-04

## 2025-08-16 ENCOUNTER — HEALTH MAINTENANCE LETTER (OUTPATIENT)
Age: 80
End: 2025-08-16